# Patient Record
Sex: FEMALE | Race: WHITE | Employment: OTHER | URBAN - METROPOLITAN AREA
[De-identification: names, ages, dates, MRNs, and addresses within clinical notes are randomized per-mention and may not be internally consistent; named-entity substitution may affect disease eponyms.]

---

## 2017-01-23 DIAGNOSIS — Z85.3 HISTORY OF LEFT BREAST CANCER: ICD-10-CM

## 2017-01-23 DIAGNOSIS — Z12.31 VISIT FOR SCREENING MAMMOGRAM: ICD-10-CM

## 2017-01-23 DIAGNOSIS — Z90.12 HISTORY OF MASTECTOMY, LEFT: ICD-10-CM

## 2017-02-07 RX ORDER — DESVENLAFAXINE SUCCINATE 100 MG/1
TABLET, EXTENDED RELEASE ORAL
Qty: 90 TAB | Refills: 0 | Status: SHIPPED | OUTPATIENT
Start: 2017-02-07 | End: 2017-03-23 | Stop reason: SDUPTHER

## 2017-03-23 RX ORDER — DESVENLAFAXINE SUCCINATE 100 MG/1
TABLET, EXTENDED RELEASE ORAL
Qty: 90 TAB | Refills: 1 | Status: SHIPPED | OUTPATIENT
Start: 2017-03-23 | End: 2017-09-19 | Stop reason: SDUPTHER

## 2017-07-24 DIAGNOSIS — Z90.12 HISTORY OF MASTECTOMY, LEFT: ICD-10-CM

## 2017-07-24 DIAGNOSIS — Z12.31 VISIT FOR SCREENING MAMMOGRAM: ICD-10-CM

## 2017-07-24 DIAGNOSIS — Z85.3 HISTORY OF LEFT BREAST CANCER: ICD-10-CM

## 2017-09-19 RX ORDER — DESVENLAFAXINE 100 MG/1
TABLET, EXTENDED RELEASE ORAL
Qty: 90 TAB | Refills: 1 | Status: SHIPPED | OUTPATIENT
Start: 2017-09-19 | End: 2017-11-07 | Stop reason: SDUPTHER

## 2017-11-07 ENCOUNTER — OFFICE VISIT (OUTPATIENT)
Dept: INTERNAL MEDICINE CLINIC | Age: 62
End: 2017-11-07

## 2017-11-07 VITALS
RESPIRATION RATE: 16 BRPM | HEART RATE: 64 BPM | WEIGHT: 167 LBS | OXYGEN SATURATION: 98 % | BODY MASS INDEX: 27.82 KG/M2 | HEIGHT: 65 IN | TEMPERATURE: 98.4 F

## 2017-11-07 DIAGNOSIS — Z23 NEED FOR TDAP VACCINATION: ICD-10-CM

## 2017-11-07 DIAGNOSIS — G89.29 CHRONIC RIGHT SHOULDER PAIN: ICD-10-CM

## 2017-11-07 DIAGNOSIS — Z00.00 ROUTINE GENERAL MEDICAL EXAMINATION AT A HEALTH CARE FACILITY: Primary | ICD-10-CM

## 2017-11-07 DIAGNOSIS — Z85.3 HISTORY OF BREAST CANCER: ICD-10-CM

## 2017-11-07 DIAGNOSIS — M25.511 CHRONIC RIGHT SHOULDER PAIN: ICD-10-CM

## 2017-11-07 RX ORDER — METFORMIN HYDROCHLORIDE 500 MG/1
500 TABLET, EXTENDED RELEASE ORAL
Qty: 90 TAB | Refills: 3 | Status: SHIPPED | OUTPATIENT
Start: 2017-11-07 | End: 2018-03-15 | Stop reason: DRUGHIGH

## 2017-11-07 RX ORDER — DESVENLAFAXINE 100 MG/1
TABLET, EXTENDED RELEASE ORAL
Qty: 90 TAB | Refills: 3 | Status: SHIPPED | OUTPATIENT
Start: 2017-11-07 | End: 2018-11-14 | Stop reason: SDUPTHER

## 2017-11-07 RX ORDER — ALPRAZOLAM 0.5 MG/1
TABLET ORAL
Qty: 30 TAB | Refills: 1 | Status: SHIPPED | OUTPATIENT
Start: 2017-11-07 | End: 2018-10-17 | Stop reason: SDUPTHER

## 2017-11-07 NOTE — MR AVS SNAPSHOT
Visit Information Date & Time Provider Department Dept. Phone Encounter #  
 11/7/2017  8:30 AM Mike Sales MD [facility] Internal Medicine [phone] Upcoming Health Maintenance Date Due DTaP/Tdap/Td series (1 - Tdap) 9/7/1976 PAP AKA CERVICAL CYTOLOGY 9/7/1976 BREAST CANCER SCRN MAMMOGRAM 1/12/2019 COLONOSCOPY 2/6/2024 Allergies as of 11/7/2017  Review Complete On: 11/7/2017 By: Mike Sales MD  
 No Known Allergies Current Immunizations  Reviewed on 11/7/2017 Name Date Tdap 11/7/2017 Reviewed by Mike Sales MD on 11/7/2017 at  9:08 AM  
 Reviewed by Mike Sales MD on 11/7/2017 at  9:24 AM  
You Were Diagnosed With   
  
 Codes Comments Routine general medical examination at a health care facility    -  Primary ICD-10-CM: Z00.00 ICD-9-CM: V70.0 Need for Tdap vaccination     ICD-10-CM: L45 ICD-9-CM: V06.1 Chronic right shoulder pain     ICD-10-CM: M25.511, G89.29 ICD-9-CM: 719.41, 338.29 History of breast cancer     ICD-10-CM: Z85.3 ICD-9-CM: V10.3 Vitals Pulse Temp Resp Height(growth percentile) Weight(growth percentile) SpO2  
 64 98.4 °F (36.9 °C) (Oral) 16 5' 4.75\" (1.645 m) 167 lb (75.8 kg) 98% BMI OB Status Smoking Status 28.01 kg/m2 Postmenopausal Never Smoker Vitals History BMI and BSA Data Body Mass Index Body Surface Area 28.01 kg/m 2 1.86 m 2 Preferred Pharmacy Pharmacy Name Phone [id number] [phone] Your Updated Medication List  
  
   
This list is accurate as of: 11/7/17  9:24 AM.  Always use your most recent med list.  
  
  
  
  
 ALPRAZolam 0.5 mg tablet Commonly known as:  XANAX  
TAKE 1 TABLET BY MOUTH THREE TIMES A DAY AS NEEDED FOR SLEEP OR ANXIETY Desvenlafaxine 100 mg Tb24 TAKE 1 TABLET BY MOUTH EVERY DAY  
  
 metFORMIN  mg tablet Commonly known as:  GLUCOPHAGE XR Take 1 Tab by mouth daily (with dinner). Prescriptions Printed Refills ALPRAZolam (XANAX) 0.5 mg tablet 1 Sig: TAKE 1 TABLET BY MOUTH THREE TIMES A DAY AS NEEDED FOR SLEEP OR ANXIETY Class: Print Prescriptions Sent to Pharmacy Refills Desvenlafaxine 100 mg Tb24 3 Sig: TAKE 1 TABLET BY MOUTH EVERY DAY Class: Normal  
 Pharmacy: [facility] [address] Ph #: [phone]  
 metFORMIN ER (GLUCOPHAGE XR) 500 mg tablet 3 Sig: Take 1 Tab by mouth daily (with dinner). Class: Normal  
 Pharmacy: [address] Ph #: [phone] Route: Oral  
  
We Performed the Following CBC WITH AUTOMATED DIFF [55309 CPT(R)] LIPID PANEL [53959 CPT(R)] METABOLIC PANEL, COMPREHENSIVE [52410 CPT(R)] OK IMMUNIZ ADMIN,1 SINGLE/COMB VAC/TOXOID V9561830 CPT(R)] REFERRAL TO OBSTETRICS AND GYNECOLOGY [REF51 Custom] REFERRAL TO PHYSICAL THERAPY [CWL05 Custom] REFERRAL TO PLASTIC SURGERY [REF89 Custom] TETANUS, DIPHTHERIA TOXOIDS AND ACELLULAR PERTUSSIS VACCINE (TDAP), IN INDIVIDS. >=7, IM [id number] CPT(R)] TSH RFX ON ABNORMAL TO FREE T4 [[id number] Custom] UA/M W/RFLX CULTURE, ROUTINE [[id number] Custom] Referral Information Referral ID Referred By Referred To  
  
 [id number] [facility] [address] [facility], 40 [facility] Visits Status Start Date End Date 1 New Request 11/7/17 11/7/18 If your referral has a status of pending review or denied, additional information will be sent to support the outcome of this decision. Referral ID Referred By Referred To  
 [id number] Sudhir ALLEN Not Available Visits Status Start Date End Date 1 New Request 11/7/17 11/7/18  If your referral has a status of pending review or denied, additional information will be sent to support the outcome of this decision. Referral ID Referred By Referred To  
 [id number] [facility] [address], [facility]6 Daquan Catalan Visits Status Start Date End Date 1 New Request 11/7/17 11/7/18 If your referral has a status of pending review or denied, additional information will be sent to support the outcome of this decision. Introducing [facility]! Dear Yumiko Morton: Thank you for requesting a [employer] account. Our records indicate that you already have an active [employer] account. You can access your account anytime at [email or web address] Did you know that you can access your hospital and ER discharge instructions at any time in [employer]? You can also review all of your test results from your hospital stay or ER visit. Additional Information If you have questions, please visit the Frequently Asked Questions section of the [employer] website at [email or web address] Remember, [employer] is NOT to be used for urgent needs. For medical emergencies, dial 911. Now available from your iPhone and Android! Please provide this summary of care documentation to your next provider. Your primary care clinician is listed as German Lakhani64 If you have any questions after today's visit, please call [phone].

## 2017-11-07 NOTE — PROGRESS NOTES
Subjective:   58 y.o. female for Well Woman Check. Her gyne and breast care is done elsewhere by her Ob-Gyne physician. Patient Active Problem List    Diagnosis Date Noted    History of breast cancer 11/07/2017    Advance directive discussed with patient 04/20/2016    Vitamin D deficiency 10/04/2012    History of Mastectomy 04/01/2009    History of tonsillectomy 04/01/2009    S/P LASIK Surgery 04/01/2009    Hx of breast reconstruction 04/01/2009    Depression 04/01/2009     Current Outpatient Prescriptions   Medication Sig Dispense Refill    Desvenlafaxine 100 mg Tb24 TAKE 1 TABLET BY MOUTH EVERY DAY 90 Tab 3    ALPRAZolam (XANAX) 0.5 mg tablet TAKE 1 TABLET BY MOUTH THREE TIMES A DAY AS NEEDED FOR SLEEP OR ANXIETY 30 Tab 1    metFORMIN ER (GLUCOPHAGE XR) 500 mg tablet Take 1 Tab by mouth daily (with dinner).  80 Tab 3     No Known Allergies  Past Medical History:   Diagnosis Date    Cancer ([facility] [address]     Depression 4/1/2009    History of mastectomy 4/1/2009    History of tonsillectomy 4/1/2009    Hx of breast reconstruction 4/1/2009    S/P LASIK surgery 4/1/2009     Past Surgical History:   Procedure Laterality Date    BREAST SURGERY PROCEDURE UNLISTED      HX BREAST RECONSTRUCTION      HX MASTECTOMY      HX REFRACTIVE SURGERY      HX TONSILLECTOMY       Family History   Problem Relation Age of Onset    Cancer Mother      Breast    Heart Failure Mother     Heart Disease Mother     Cancer Father      Brain tumor    Cancer Maternal Grandmother     Psychiatric Disorder Daughter     Psychiatric Disorder Daughter      Social History   Substance Use Topics    Smoking status: Never Smoker    Smokeless tobacco: Never Used    Alcohol use 3.5 oz/week     7 Standard drinks or equivalent per week        Lab Results   Component Value Date/Time    WBC 6.1 12/08/2016 08:50 AM    HGB 13.6 12/08/2016 08:50 AM    HCT 40.6 12/08/2016 08:50 AM    PLATELET 797 05/42/9678 08:50 AM    MCV 88 12/08/2016 08:50 AM     Lab Results   Component Value Date/Time    Cholesterol, total 246 12/08/2016 08:50 AM    HDL Cholesterol 71 12/08/2016 08:50 AM    LDL, calculated 162 12/08/2016 08:50 AM    Triglyceride 66 12/08/2016 08:50 AM     Lab Results   Component Value Date/Time    ALT (SGPT) 18 12/08/2016 08:50 AM    AST (SGOT) 19 12/08/2016 08:50 AM    Alk. phosphatase 113 12/08/2016 08:50 AM    Bilirubin, total <0.2 12/08/2016 08:50 AM    Albumin 4.1 12/08/2016 08:50 AM    Protein, total 6.3 12/08/2016 08:50 AM    PLATELET 678 26/50/3056 08:50 AM    Hepatitis C RNA, QL, ANNEL Negative 12/13/2016 09:02 AM       Lab Results   Component Value Date/Time    GFR est non-AA 94 12/08/2016 08:50 AM    GFR est  12/08/2016 08:50 AM    Creatinine 0.69 12/08/2016 08:50 AM    BUN 13 12/08/2016 08:50 AM    Sodium 145 12/08/2016 08:50 AM    Potassium 4.5 12/08/2016 08:50 AM    Chloride 104 12/08/2016 08:50 AM    CO2 28 12/08/2016 08:50 AM     Lab Results   Component Value Date/Time    TSH 1.580 12/08/2016 08:50 AM    TSH 1.450 11/21/2013 08:29 AM    T4, Free 1.26 11/21/2013 08:29 AM      Lab Results   Component Value Date/Time    Glucose 84 12/08/2016 08:50 AM                           Specific concerns today: Ongoing issues with her right shoulder. She seen an orthopedist and had it injected twice. She needs to do physical therapy and needs a referral for that. It is painful mostly at nighttime. She is also had increasing issues with her breast implants from previous reconstruction and would like to consider repeat surgery. She is up-to-date on mammogram and breast MRI. Does feel that depression and anxiety are under good control with the current medicines. She is struggling with how to handle health care on an ongoing basis. .    Review of Systems  A comprehensive review of systems was negative except for that written in the HPI. Objective:   Pulse 64, temperature 98.4 °F (36.9 °C), temperature source Oral, resp.  rate 16, height 5' 4.75\" (1.645 m), weight 167 lb (75.8 kg), SpO2 98 %. Physical Examination:   General appearance - alert, well appearing, and in no distress  Eyes - pupils equal and reactive, extraocular eye movements intact  Ears - bilateral TM's and external ear canals normal  Nose - normal and patent, no erythema, discharge or polyps  Mouth - mucous membranes moist, pharynx normal without lesions  Neck - supple, no significant adenopathy  Lymphatics - no palpable lymphadenopathy, no hepatosplenomegaly  Chest - clear to auscultation, no wheezes, rales or rhonchi, symmetric air entry  Heart - normal rate, regular rhythm, normal S1, S2, no murmurs, rubs, clicks or gallops  Abdomen - soft, nontender, nondistended, no masses or organomegaly  Neurological - alert, oriented, normal speech, no focal findings or movement disorder noted  Musculoskeletal - abnormal exam of right shoulder with some mild crepitus and anterior joint line tenderness. No effusion or erythema. Normal range of motion. Extremities - peripheral pulses normal, no pedal edema, no clubbing or cyanosis     Assessment/Plan:     lose weight, increase physical activity, routine labs ordered  Diagnoses and all orders for this visit:    1. Routine general medical examination at a health care facility  -     CBC WITH AUTOMATED DIFF  -     METABOLIC PANEL, COMPREHENSIVE  -     LIPID PANEL  -     TSH RFX ON ABNORMAL TO FREE T4  -     UA/M W/RFLX CULTURE, ROUTINE  -     Desvenlafaxine 100 mg Tb24; TAKE 1 TABLET BY MOUTH EVERY DAY  -     ALPRAZolam (XANAX) 0.5 mg tablet; TAKE 1 TABLET BY MOUTH THREE TIMES A DAY AS NEEDED FOR SLEEP OR ANXIETY  -     REFERRAL TO OBSTETRICS AND GYNECOLOGY    2. Need for Tdap vaccination  -     TETANUS, DIPHTHERIA TOXOIDS AND ACELLULAR PERTUSSIS VACCINE (TDAP), IN INDIVIDS. >=7, IM  -     ID IMMUNIZ ADMIN,1 SINGLE/COMB VAC/TOXOID    3. Chronic right shoulder pain impingement syndrome.   Will refer for physical therapy and have her continue to see Sebastian Julia as needed. -     REFERRAL TO PHYSICAL THERAPY    4. History of breast cancer will refer for an evaluation for re-do of her breast implants.  -     REFERRAL TO PLASTIC SURGERY    Other orders given her weight issues we discussed the whole 30 diet as well as fast metabolism. Will add Metformin for insulin sensitivity and she will let me know how she doing with that in a month. -     metFORMIN ER (GLUCOPHAGE XR) 500 mg tablet; Take 1 Tab by mouth daily (with dinner). Follow-up Disposition: 12 months and as needed   Advised her to call back or return to office if symptoms worsen/change/persist.  Discussed expected course/resolution/complications of diagnosis in detail with patient. Medication risks/benefits/costs/interactions/alternatives discussed with patient. She was given an after visit summary which includes diagnoses, current medications, & vitals. She expressed understanding with the diagnosis and plan.

## 2017-12-30 LAB
ALBUMIN SERPL-MCNC: 4.3 G/DL (ref 3.6–4.8)
ALBUMIN/GLOB SERPL: 2 {RATIO} (ref 1.2–2.2)
ALP SERPL-CCNC: 110 IU/L (ref 39–117)
ALT SERPL-CCNC: 24 IU/L (ref 0–32)
APPEARANCE UR: CLEAR
AST SERPL-CCNC: 24 IU/L (ref 0–40)
BACTERIA #/AREA URNS HPF: ABNORMAL /[HPF]
BASOPHILS # BLD AUTO: 0 X10E3/UL (ref 0–0.2)
BASOPHILS NFR BLD AUTO: 0 %
BILIRUB SERPL-MCNC: 0.2 MG/DL (ref 0–1.2)
BILIRUB UR QL STRIP: NEGATIVE
BUN SERPL-MCNC: 13 MG/DL (ref 8–27)
BUN/CREAT SERPL: 20 (ref 12–28)
CALCIUM SERPL-MCNC: 8.7 MG/DL (ref 8.7–10.3)
CASTS URNS QL MICRO: ABNORMAL /LPF
CHLORIDE SERPL-SCNC: 102 MMOL/L (ref 96–106)
CHOLEST SERPL-MCNC: 247 MG/DL (ref 100–199)
CO2 SERPL-SCNC: 25 MMOL/L (ref 18–29)
COLOR UR: YELLOW
CREAT SERPL-MCNC: 0.64 MG/DL (ref 0.57–1)
EOSINOPHIL # BLD AUTO: 0.3 X10E3/UL (ref 0–0.4)
EOSINOPHIL NFR BLD AUTO: 4 %
EPI CELLS #/AREA URNS HPF: ABNORMAL /HPF
ERYTHROCYTE [DISTWIDTH] IN BLOOD BY AUTOMATED COUNT: 13.6 % (ref 12.3–15.4)
GLOBULIN SER CALC-MCNC: 2.2 G/DL (ref 1.5–4.5)
GLUCOSE SERPL-MCNC: 89 MG/DL (ref 65–99)
GLUCOSE UR QL: NEGATIVE
HCT VFR BLD AUTO: 42.5 % (ref 34–46.6)
HDLC SERPL-MCNC: 75 MG/DL
HGB BLD-MCNC: 14.6 G/DL (ref 11.1–15.9)
HGB UR QL STRIP: ABNORMAL
IMM GRANULOCYTES # BLD: 0 X10E3/UL (ref 0–0.1)
IMM GRANULOCYTES NFR BLD: 0 %
KETONES UR QL STRIP: NEGATIVE
LDLC SERPL CALC-MCNC: 149 MG/DL (ref 0–99)
LEUKOCYTE ESTERASE UR QL STRIP: NEGATIVE
LYMPHOCYTES # BLD AUTO: 2 X10E3/UL (ref 0.7–3.1)
LYMPHOCYTES NFR BLD AUTO: 35 %
MCH RBC QN AUTO: 30.1 PG (ref 26.6–33)
MCHC RBC AUTO-ENTMCNC: 34.4 G/DL (ref 31.5–35.7)
MCV RBC AUTO: 88 FL (ref 79–97)
MICRO URNS: ABNORMAL
MONOCYTES # BLD AUTO: 0.4 X10E3/UL (ref 0.1–0.9)
MONOCYTES NFR BLD AUTO: 7 %
MUCOUS THREADS URNS QL MICRO: PRESENT
NEUTROPHILS # BLD AUTO: 3.2 X10E3/UL (ref 1.4–7)
NEUTROPHILS NFR BLD AUTO: 54 %
NITRITE UR QL STRIP: NEGATIVE
PH UR STRIP: 6 [PH] (ref 5–7.5)
PLATELET # BLD AUTO: 273 X10E3/UL (ref 150–379)
POTASSIUM SERPL-SCNC: 4 MMOL/L (ref 3.5–5.2)
PROT SERPL-MCNC: 6.5 G/DL (ref 6–8.5)
PROT UR QL STRIP: NEGATIVE
RBC # BLD AUTO: 4.85 X10E6/UL (ref 3.77–5.28)
RBC #/AREA URNS HPF: ABNORMAL /HPF
SODIUM SERPL-SCNC: 143 MMOL/L (ref 134–144)
SP GR UR: 1.02 (ref 1–1.03)
TRIGL SERPL-MCNC: 116 MG/DL (ref 0–149)
TSH SERPL DL<=0.005 MIU/L-ACNC: 1.75 UIU/ML (ref 0.45–4.5)
URINALYSIS REFLEX, 377202: ABNORMAL
UROBILINOGEN UR STRIP-MCNC: 0.2 MG/DL (ref 0.2–1)
VLDLC SERPL CALC-MCNC: 23 MG/DL (ref 5–40)
WBC # BLD AUTO: 5.9 X10E3/UL (ref 3.4–10.8)
WBC #/AREA URNS HPF: ABNORMAL /HPF

## 2018-03-15 ENCOUNTER — OFFICE VISIT (OUTPATIENT)
Dept: INTERNAL MEDICINE CLINIC | Age: 63
End: 2018-03-15

## 2018-03-15 VITALS
BODY MASS INDEX: 26.69 KG/M2 | RESPIRATION RATE: 12 BRPM | SYSTOLIC BLOOD PRESSURE: 128 MMHG | HEART RATE: 70 BPM | OXYGEN SATURATION: 97 % | TEMPERATURE: 97.5 F | WEIGHT: 160.2 LBS | DIASTOLIC BLOOD PRESSURE: 90 MMHG | HEIGHT: 65 IN

## 2018-03-15 DIAGNOSIS — R63.8 WEIGHT DISORDER: Primary | ICD-10-CM

## 2018-03-15 RX ORDER — VALACYCLOVIR HYDROCHLORIDE 1 G/1
2000 TABLET, FILM COATED ORAL 2 TIMES DAILY
Qty: 16 TAB | Refills: 5 | Status: SHIPPED | OUTPATIENT
Start: 2018-03-15 | End: 2019-06-07 | Stop reason: SDUPTHER

## 2018-03-15 RX ORDER — METFORMIN HYDROCHLORIDE 1000 MG/1
1000 TABLET ORAL DAILY
Qty: 90 TAB | Refills: 1 | Status: SHIPPED | OUTPATIENT
Start: 2018-03-15 | End: 2018-12-16 | Stop reason: SDUPTHER

## 2018-03-15 NOTE — MR AVS SNAPSHOT
[address] [address] 
[phone] Patient: Leanna Toledo MRN:  [id number] Visit Information Date & Time Provider Department Dept. Phone Encounter #  
 3/15/2018  1:15 PM Aleksandar Power MD [address] [address] Internal Medicine [phone] [id number] Upcoming Health Maintenance Date Due  
 PAP AKA CERVICAL CYTOLOGY 9/7/1976 BREAST CANCER SCRN MAMMOGRAM 1/12/2019 COLONOSCOPY 2/6/2024 DTaP/Tdap/Td series (2 - Td) 11/7/2027 Allergies as of 3/15/2018  Review Complete On: 3/15/2018 By: Sharri Rodriguez LPN No Known Allergies Current Immunizations  Reviewed on 11/7/2017 Name Date Tdap 11/7/2017 Not reviewed this visit You Were Diagnosed With   
  
 Codes Comments Weight disorder    -  Primary ICD-10-CM: R63.8 ICD-9-CM: 603. 9 Vitals BP Pulse Temp Resp Height(growth percentile) Weight(growth percentile) 128/90 70 97.5 °F (36.4 °C) (Oral) 12 5' 4.75\" (1.645 m) 160 lb 3.2 oz (72.7 kg) SpO2 BMI OB Status Smoking Status 97% 26.86 kg/m2 Postmenopausal Never Smoker Vitals History BMI and BSA Data Body Mass Index Body Surface Area  
 26.86 kg/m 2 1.82 m 2 Preferred Pharmacy [facility] Name Phone [id number] [phone] Your Updated Medication List  
  
   
This list is accurate as of 3/15/18  1:47 PM.  Always use your most recent med list.  
  
  
  
  
 ALPRAZolam 0.5 mg tablet Commonly known as:  XANAX  
TAKE 1 TABLET BY MOUTH THREE TIMES A DAY AS NEEDED FOR SLEEP OR ANXIETY Desvenlafaxine 100 mg Tb24 TAKE 1 TABLET BY MOUTH EVERY DAY  
  
 metFORMIN 1,000 mg tablet Commonly known as:  GLUCOPHAGE Take 1 Tab by mouth daily. valACYclovir 1 gram tablet Commonly known as:  VALTREX Take 2 Tabs by mouth two (2) times a day for 1 day. Prescriptions Sent to Pharmacy Refills  
 valACYclovir (VALTREX) 1 gram tablet 5 Sig: Take 2 Tabs by mouth two (2) times a day for 1 day. Class: Normal  
 Pharmacy: [address] Ph #: [phone] Route: Oral  
 metFORMIN (GLUCOPHAGE) 1,000 mg tablet 1 Sig: Take 1 Tab by mouth daily. Class: Normal  
 [facility] [address] [facility]: [phone] Route: Oral  
  
We Performed the Following   
 T3 TOTAL R3080983 CPT(R)] T4, FREE R2060833 CPT(R)] TSH 3RD GENERATION [80467 CPT(R)] Introducing [facility]! Dear Zena Ashton: Thank you for requesting a [employer] account. Our records indicate that you already have an active [employer] account. You can access your account anytime at [email or web address] Did you know that you can access your hospital and ER discharge instructions at any time in [employer] You can also review all of your test results from your hospital stay or ER visit. Additional Information If you have questions, please visit the Frequently Asked Questions section of the [employer] website at [email or web address] Remember, [employer] is NOT to be used for urgent needs. For medical emergencies, dial 911. Now available from your iPhone and Android! Please provide this summary of care documentation to your next provider. Your primary care clinician is listed as German Lakhani64 If you have any questions after today's visit, please call [phone].

## 2018-03-15 NOTE — PROGRESS NOTES
HPI:  Frederic Luna is a 58y.o. year old female who is here for a routine visit:    Here for follow-up visit and to discuss her weight. She has been on a high-protein low-carb diet for the last 3 months. She has lost about 9 pounds. She did try taking Metformin 1 a day but stopped that she did not feel it was doing much. I had recommended her increase to 1000 mg per day but she has not done that yet. She has been exercising 4 times per week or more. She feels very well. She has a wedding coming up in May and is concerned about getting more weight off. She denies any chest pains or shortness of breath. No nausea vomiting. She is worried about thyroid issues. Her previous TSH has been normal for years. Past Medical History:   Diagnosis Date    Cancer ([address]     Depression 4/1/2009    History of mastectomy 4/1/2009    History of tonsillectomy 4/1/2009    Hx of breast reconstruction 4/1/2009    S/P LASIK surgery 4/1/2009       Past Surgical History:   Procedure Laterality Date    BREAST SURGERY PROCEDURE UNLISTED      HX BREAST RECONSTRUCTION      HX MASTECTOMY      HX REFRACTIVE SURGERY      HX TONSILLECTOMY         Prior to Admission medications    Medication Sig Start Date End Date Taking? Authorizing Provider   valACYclovir (VALTREX) 1 gram tablet Take 2 Tabs by mouth two (2) times a day for 1 day. 3/15/18 3/16/18 Yes Rhonda Johnson III, MD   metFORMIN (GLUCOPHAGE) 1,000 mg tablet Take 1 Tab by mouth daily. 3/15/18  Yes Rhonda Johnson III, MD   Desvenlafaxine 100 mg Tb24 TAKE 1 TABLET BY MOUTH EVERY DAY 11/7/17  Yes Haseeb Menard MD   ALPRAZolam [place]) 0.5 mg tablet TAKE 1 TABLET BY MOUTH THREE TIMES A DAY AS NEEDED FOR SLEEP OR ANXIETY 11/7/17  Yes Haseeb Menard MD       Social History     Social History    Marital status:      Spouse name: N/A    Number of children: N/A    Years of education: N/A     Occupational History    Not on file.      Social History Main Topics    Smoking status: Never Smoker    Smokeless tobacco: Never Used    Alcohol use 1.2 oz/week     2 Standard drinks or equivalent per week    Drug use: No    Sexual activity: Yes     Partners: Male     Other Topics Concern    Not on file     Social History Narrative          ROS  Per HPI    Visit Vitals    /90    Pulse 70    Temp 97.5 °F (36.4 °C) (Oral)    Resp 12    Ht 5' 4.75\" (1.645 m)    Wt 160 lb 3.2 oz (72.7 kg)    SpO2 97%    BMI 26.86 kg/m2         Physical Exam     Physical Examination: General appearance - alert, well appearing, and in no distress  Chest - clear to auscultation, no wheezes, rales or rhonchi, symmetric air entry  Heart - normal rate, regular rhythm, normal S1, S2, no murmurs, rubs, clicks or gallops    Assessment/Plan:  Diagnoses and all orders for this visit:    1. Weight disorder -likely due to metabolic issues. Will recheck her thyroid function today. Will increase her Metformin to 1000 mg per day. We discussed appetite suppressants and stimulants and she wishes to defer that for 1 more month and then make a decision about using those. -     T4, FREE  -     T3 TOTAL  -     TSH 3RD GENERATION    Other orders  -     valACYclovir (VALTREX) 1 gram tablet; Take 2 Tabs by mouth two (2) times a day for 1 day. -     metFORMIN (GLUCOPHAGE) 1,000 mg tablet; Take 1 Tab by mouth daily. Follow-up Disposition:as needed       Advised her to call back or return to office if symptoms worsen/change/persist.  Discussed expected course/resolution/complications of diagnosis in detail with patient. Medication risks/benefits/costs/interactions/alternatives discussed with patient. She was given an after visit summary which includes diagnoses, current medications, & vitals. She expressed understanding with the diagnosis and plan.

## 2018-03-16 LAB
T3 SERPL-MCNC: 102 NG/DL (ref 71–180)
T4 FREE SERPL-MCNC: 1.15 NG/DL (ref 0.82–1.77)
TSH SERPL DL<=0.005 MIU/L-ACNC: 1.4 UIU/ML (ref 0.45–4.5)

## 2018-10-17 DIAGNOSIS — Z00.00 ROUTINE GENERAL MEDICAL EXAMINATION AT A HEALTH CARE FACILITY: ICD-10-CM

## 2018-10-17 RX ORDER — ALPRAZOLAM 0.5 MG/1
0.5 TABLET ORAL
Qty: 30 TAB | Refills: 1 | OUTPATIENT
Start: 2018-10-17 | End: 2021-12-22 | Stop reason: SDUPTHER

## 2018-10-17 NOTE — TELEPHONE ENCOUNTER
Alprazolam (Xanax) 0.5mg tabs  [id number]    Patient flying to [place] on Saturday. Needs to get this by Thurs or Fri.

## 2018-10-18 NOTE — TELEPHONE ENCOUNTER
Orders Placed This Encounter    ALPRAZolam (XANAX) 0.5 mg tablet     Sig: Take 1 Tab by mouth nightly as needed for Anxiety. Max Daily Amount: 0.5 mg.     Dispense:  30 Tab     Refill:  1     This request is for a new prescription for a controlled substance as required by Federal/State law. .     The above controlled substance refill was called into patients pharmacy - [facility]/ - authorized by Dr. Aram Luis.

## 2018-11-14 DIAGNOSIS — Z00.00 ROUTINE GENERAL MEDICAL EXAMINATION AT A HEALTH CARE FACILITY: ICD-10-CM

## 2018-11-14 RX ORDER — DESVENLAFAXINE 100 MG/1
TABLET, EXTENDED RELEASE ORAL
Qty: 30 TAB | Refills: 0 | Status: SHIPPED | OUTPATIENT
Start: 2018-11-14 | End: 2018-12-16 | Stop reason: SDUPTHER

## 2018-12-03 ENCOUNTER — OFFICE VISIT (OUTPATIENT)
Dept: INTERNAL MEDICINE CLINIC | Age: 63
End: 2018-12-03

## 2018-12-03 VITALS
OXYGEN SATURATION: 98 % | WEIGHT: 151 LBS | SYSTOLIC BLOOD PRESSURE: 144 MMHG | DIASTOLIC BLOOD PRESSURE: 94 MMHG | TEMPERATURE: 98.5 F | HEIGHT: 65 IN | BODY MASS INDEX: 25.16 KG/M2 | RESPIRATION RATE: 14 BRPM | HEART RATE: 67 BPM

## 2018-12-03 DIAGNOSIS — Z85.3 HISTORY OF BREAST CANCER: ICD-10-CM

## 2018-12-03 DIAGNOSIS — Z00.00 ROUTINE GENERAL MEDICAL EXAMINATION AT A HEALTH CARE FACILITY: Primary | ICD-10-CM

## 2018-12-03 DIAGNOSIS — Z23 NEED FOR SHINGLES VACCINE: ICD-10-CM

## 2018-12-03 DIAGNOSIS — Z00.00 WELL WOMAN EXAM (NO GYNECOLOGICAL EXAM): ICD-10-CM

## 2018-12-03 DIAGNOSIS — F32.A MILD DEPRESSION: ICD-10-CM

## 2018-12-03 DIAGNOSIS — Z12.4 PAP SMEAR FOR CERVICAL CANCER SCREENING: ICD-10-CM

## 2018-12-04 NOTE — PROGRESS NOTES
Subjective:   61 y.o. female for Well Woman Check. Her gyne and breast care is done elsewhere by her Ob-Gyne physician. Patient Active Problem List    Diagnosis Date Noted    Mild depression ([facility] [address] 12/03/2018    History of breast cancer 11/07/2017    Advance directive discussed with patient 04/20/2016    Vitamin D deficiency 10/04/2012    History of Mastectomy 04/01/2009    History of tonsillectomy 04/01/2009    S/P LASIK Surgery 04/01/2009    Hx of breast reconstruction 04/01/2009    Depression 04/01/2009     Current Outpatient Medications   Medication Sig Dispense Refill    varicella-zoster recombinant, PF, (SHINGRIX, PF,) 50 mcg/0.5 mL susr injection 0.5 mL by IntraMUSCular route once for 1 dose. 0.5 mL 0    Desvenlafaxine 100 mg Tb24 TAKE 1 TABLET BY MOUTH EVERY DAY 30 Tab 0    ALPRAZolam (XANAX) 0.5 mg tablet Take 1 Tab by mouth nightly as needed for Anxiety. Max Daily Amount: 0.5 mg. 30 Tab 1    metFORMIN (GLUCOPHAGE) 1,000 mg tablet Take 1 Tab by mouth daily. 90 Tab 1     No Known Allergies  Past Medical History:   Diagnosis Date    [facility]     Depression 4/1/2009    History of mastectomy 4/1/2009    History of tonsillectomy 4/1/2009    Hx of breast reconstruction 4/1/2009    S/P LASIK surgery 4/1/2009     Past Surgical History:   Procedure Laterality Date    BREAST SURGERY PROCEDURE UNLISTED      HX BREAST RECONSTRUCTION      HX MASTECTOMY      HX REFRACTIVE SURGERY      HX TONSILLECTOMY       Family History   Problem Relation Age of Onset    Cancer Mother         Breast    Heart Failure Mother     Heart Disease Mother     Cancer Father         Brain tumor    Cancer Maternal Grandmother     Psychiatric Disorder Daughter     Psychiatric Disorder Daughter      Social History     Tobacco Use    Smoking status: Never Smoker    Smokeless tobacco: Never Used   Substance Use Topics    Alcohol use:  Yes     Alcohol/week: 1.2 oz     Types: 2 Standard drinks or equivalent per week     Frequency: 4 or more times a week     Drinks per session: 1 or 2        Lab Results   Component Value Date/Time    WBC 5.9 12/29/2017 09:10 AM    HGB 14.6 12/29/2017 09:10 AM    HCT 42.5 12/29/2017 09:10 AM    PLATELET 893 84/76/6128 09:10 AM    MCV 88 12/29/2017 09:10 AM     Lab Results   Component Value Date/Time    Cholesterol, total 247 (H) 12/29/2017 09:10 AM    HDL Cholesterol 75 12/29/2017 09:10 AM    LDL, calculated 149 (H) 12/29/2017 09:10 AM    Triglyceride 116 12/29/2017 09:10 AM     Lab Results   Component Value Date/Time    ALT (SGPT) 24 12/29/2017 09:10 AM    AST (SGOT) 24 12/29/2017 09:10 AM    Alk. phosphatase 110 12/29/2017 09:10 AM    Bilirubin, total 0.2 12/29/2017 09:10 AM    Albumin 4.3 12/29/2017 09:10 AM    Protein, total 6.5 12/29/2017 09:10 AM    PLATELET 828 23/53/5306 09:10 AM    Hepatitis C RNA, QL, ANNEL Negative 12/13/2016 09:02 AM     Lab Results   Component Value Date/Time    GFR est non-AA 96 12/29/2017 09:10 AM    GFR est  12/29/2017 09:10 AM    Creatinine 0.64 12/29/2017 09:10 AM    BUN 13 12/29/2017 09:10 AM    Sodium 143 12/29/2017 09:10 AM    Potassium 4.0 12/29/2017 09:10 AM    Chloride 102 12/29/2017 09:10 AM    CO2 25 12/29/2017 09:10 AM     Lab Results   Component Value Date/Time    TSH 1.400 03/15/2018 02:17 PM    T4, Free 1.15 03/15/2018 02:17 PM      Lab Results   Component Value Date/Time    Glucose 89 12/29/2017 09:10 AM         Specific concerns today: None. Review of Systems  A comprehensive review of systems was negative. Objective:   Blood pressure (!) 144/94, pulse 67, temperature 98.5 °F (36.9 °C), temperature source Oral, resp. rate 14, height 5' 4.5\" (1.638 m), weight 151 lb (68.5 kg), SpO2 98 %.    Physical Examination:   General appearance - alert, well appearing, and in no distress  Eyes - pupils equal and reactive, extraocular eye movements intact  Ears - bilateral TM's and external ear canals normal  Nose - normal and patent, no erythema, discharge or polyps  Mouth - mucous membranes moist, pharynx normal without lesions  Neck - supple, no significant adenopathy  Lymphatics - no palpable lymphadenopathy, no hepatosplenomegaly  Chest - clear to auscultation, no wheezes, rales or rhonchi, symmetric air entry  Heart - normal rate, regular rhythm, normal S1, S2, no murmurs, rubs, clicks or gallops  Abdomen - soft, nontender, nondistended, no masses or organomegaly  Back exam - full range of motion, no tenderness, palpable spasm or pain on motion  Neurological - alert, oriented, normal speech, no focal findings or movement disorder noted  Musculoskeletal - no joint tenderness, deformity or swelling  Extremities - peripheral pulses normal, no pedal edema, no clubbing or cyanosis  Skin - normal coloration and turgor, no rashes, no suspicious skin lesions noted     Assessment/Plan:     lose weight, routine labs ordered  Diagnoses and all orders for this visit:    1. Routine general medical examination at a health care facility  -     CBC WITH AUTOMATED DIFF  -     LIPID PANEL  -     TSH RFX ON ABNORMAL TO FREE T4  -     METABOLIC PANEL, COMPREHENSIVE  -     UA/M W/RFLX CULTURE, ROUTINE    2. Pap smear for cervical cancer screening    3. Need for shingles vaccine  -     varicella-zoster recombinant, PF, (SHINGRIX, PF,) 50 mcg/0.5 mL susr injection; 0.5 mL by IntraMUSCular route once for 1 dose. 4. History of breast cancer -stable. She has a gynecology visit coming up in the next few days. Will also order breast MRI and mammogram.  -     [facility] 3D ANDRE W MAMMO BI DX INCL CAD; Future  -     MRI BREAST BI W WO CONT; Future    5. Mild depression ([address] -controlled on current meds. Will continue these for now. 10. Well woman exam (no gynecological exam)  Comments:  [V70.0]       Follow-up Disposition:  Return in about 1 year (around 12/3/2019).    Advised her to call back or return to office if symptoms worsen/change/persist.  Discussed expected course/resolution/complications of diagnosis in detail with patient. Medication risks/benefits/costs/interactions/alternatives discussed with patient. She was given an after visit summary which includes diagnoses, current medications, & vitals. She expressed understanding with the diagnosis and plan.

## 2018-12-16 DIAGNOSIS — Z00.00 ROUTINE GENERAL MEDICAL EXAMINATION AT A HEALTH CARE FACILITY: ICD-10-CM

## 2018-12-16 RX ORDER — METFORMIN HYDROCHLORIDE 1000 MG/1
1000 TABLET ORAL DAILY
Qty: 90 TAB | Refills: 1 | Status: SHIPPED | OUTPATIENT
Start: 2018-12-16 | End: 2019-06-07 | Stop reason: SDUPTHER

## 2018-12-16 RX ORDER — DESVENLAFAXINE 100 MG/1
TABLET, EXTENDED RELEASE ORAL
Qty: 90 TAB | Refills: 3 | Status: SHIPPED | OUTPATIENT
Start: 2018-12-16 | End: 2019-11-16 | Stop reason: SDUPTHER

## 2019-03-05 LAB
ALBUMIN SERPL-MCNC: 4 G/DL (ref 3.6–4.8)
ALBUMIN/GLOB SERPL: 1.7 {RATIO} (ref 1.2–2.2)
ALP SERPL-CCNC: 108 IU/L (ref 39–117)
ALT SERPL-CCNC: 14 IU/L (ref 0–32)
APPEARANCE UR: CLEAR
AST SERPL-CCNC: 16 IU/L (ref 0–40)
BACTERIA #/AREA URNS HPF: ABNORMAL /[HPF]
BASOPHILS # BLD AUTO: 0 X10E3/UL (ref 0–0.2)
BASOPHILS NFR BLD AUTO: 0 %
BILIRUB SERPL-MCNC: 0.3 MG/DL (ref 0–1.2)
BILIRUB UR QL STRIP: NEGATIVE
BUN SERPL-MCNC: 13 MG/DL (ref 8–27)
BUN/CREAT SERPL: 18 (ref 12–28)
CALCIUM SERPL-MCNC: 9 MG/DL (ref 8.7–10.3)
CASTS URNS QL MICRO: ABNORMAL /LPF
CHLORIDE SERPL-SCNC: 106 MMOL/L (ref 96–106)
CHOLEST SERPL-MCNC: 223 MG/DL (ref 100–199)
CO2 SERPL-SCNC: 24 MMOL/L (ref 20–29)
COLOR UR: YELLOW
CREAT SERPL-MCNC: 0.72 MG/DL (ref 0.57–1)
EOSINOPHIL # BLD AUTO: 0.3 X10E3/UL (ref 0–0.4)
EOSINOPHIL NFR BLD AUTO: 6 %
EPI CELLS #/AREA URNS HPF: ABNORMAL /HPF
ERYTHROCYTE [DISTWIDTH] IN BLOOD BY AUTOMATED COUNT: 13.9 % (ref 12.3–15.4)
GLOBULIN SER CALC-MCNC: 2.3 G/DL (ref 1.5–4.5)
GLUCOSE SERPL-MCNC: 81 MG/DL (ref 65–99)
GLUCOSE UR QL: NEGATIVE
HCT VFR BLD AUTO: 41.3 % (ref 34–46.6)
HDLC SERPL-MCNC: 79 MG/DL
HGB BLD-MCNC: 13.3 G/DL (ref 11.1–15.9)
HGB UR QL STRIP: ABNORMAL
IMM GRANULOCYTES # BLD AUTO: 0 X10E3/UL (ref 0–0.1)
IMM GRANULOCYTES NFR BLD AUTO: 0 %
KETONES UR QL STRIP: NEGATIVE
LDLC SERPL CALC-MCNC: 133 MG/DL (ref 0–99)
LEUKOCYTE ESTERASE UR QL STRIP: NEGATIVE
LYMPHOCYTES # BLD AUTO: 1.9 X10E3/UL (ref 0.7–3.1)
LYMPHOCYTES NFR BLD AUTO: 35 %
MCH RBC QN AUTO: 30 PG (ref 26.6–33)
MCHC RBC AUTO-ENTMCNC: 32.2 G/DL (ref 31.5–35.7)
MCV RBC AUTO: 93 FL (ref 79–97)
MICRO URNS: ABNORMAL
MONOCYTES # BLD AUTO: 0.4 X10E3/UL (ref 0.1–0.9)
MONOCYTES NFR BLD AUTO: 7 %
MUCOUS THREADS URNS QL MICRO: PRESENT
NEUTROPHILS # BLD AUTO: 2.7 X10E3/UL (ref 1.4–7)
NEUTROPHILS NFR BLD AUTO: 52 %
NITRITE UR QL STRIP: NEGATIVE
PH UR STRIP: 7 [PH] (ref 5–7.5)
PLATELET # BLD AUTO: 269 X10E3/UL (ref 150–379)
POTASSIUM SERPL-SCNC: 4.6 MMOL/L (ref 3.5–5.2)
PROT SERPL-MCNC: 6.3 G/DL (ref 6–8.5)
PROT UR QL STRIP: NEGATIVE
RBC # BLD AUTO: 4.43 X10E6/UL (ref 3.77–5.28)
RBC #/AREA URNS HPF: ABNORMAL /HPF
SODIUM SERPL-SCNC: 144 MMOL/L (ref 134–144)
SP GR UR: 1.02 (ref 1–1.03)
TRIGL SERPL-MCNC: 54 MG/DL (ref 0–149)
TSH SERPL DL<=0.005 MIU/L-ACNC: 1.63 UIU/ML (ref 0.45–4.5)
URINALYSIS REFLEX, 377202: ABNORMAL
UROBILINOGEN UR STRIP-MCNC: 0.2 MG/DL (ref 0.2–1)
VLDLC SERPL CALC-MCNC: 11 MG/DL (ref 5–40)
WBC # BLD AUTO: 5.3 X10E3/UL (ref 3.4–10.8)
WBC #/AREA URNS HPF: ABNORMAL /HPF

## 2019-03-08 ENCOUNTER — TELEPHONE (OUTPATIENT)
Dept: INTERNAL MEDICINE CLINIC | Age: 64
End: 2019-03-08

## 2019-03-08 NOTE — TELEPHONE ENCOUNTER
----- Message from Melody Lopez MD sent at 3/5/2019  1:24 PM EST -----  Notify OK but blood in the urine. Repeat UA.

## 2019-03-08 NOTE — TELEPHONE ENCOUNTER
Spoke with pt in ref to lab results. Ua had a trace of hematuria. Per SRJ, to see urology for f/u. This is a change from his note to repeat a ua as pt has seen urology in the past. Pt doesn't feel she needs to see urology again as she had a full w/o that was negative. Discussed with SRJ, and he said that was fine. Red flags reviewed.

## 2019-04-04 ENCOUNTER — TELEPHONE (OUTPATIENT)
Dept: INTERNAL MEDICINE CLINIC | Age: 64
End: 2019-04-04

## 2019-04-04 RX ORDER — PROMETHAZINE HYDROCHLORIDE 25 MG/1
25 TABLET ORAL
Qty: 5 TAB | Refills: 0 | Status: SHIPPED | OUTPATIENT
Start: 2019-04-04 | End: 2019-12-13 | Stop reason: ALTCHOICE

## 2019-04-04 NOTE — TELEPHONE ENCOUNTER
Spoke with pt and advised that per SRJ will send Phenergan 25 mg to pharm. She is having breast MRI in the am.     Orders Placed This Encounter    promethazine (PHENERGAN) 25 mg tablet     Sig: Take 1 Tab by mouth every six (6) hours as needed for Nausea. Dispense:  5 Tab     Refill:  0     The above orders were approved via [facility] per Dr. Rosamaria Calix, III.

## 2019-04-04 NOTE — TELEPHONE ENCOUNTER
Pt walked in requesting medication \"to make her not throw up. \"  She was unsure of the name, but is having an MRI tomorrow @ 0730 & says she gets sick from the contrast dye.   Please send to [place] on [address] and call when done

## 2019-04-08 RX ORDER — ONDANSETRON 4 MG/1
4 TABLET, ORALLY DISINTEGRATING ORAL
Qty: 10 TAB | Refills: 0 | Status: SHIPPED | OUTPATIENT
Start: 2019-04-08 | End: 2020-12-09 | Stop reason: SDUPTHER

## 2019-06-07 RX ORDER — SULFAMETHOXAZOLE AND TRIMETHOPRIM 800; 160 MG/1; MG/1
1 TABLET ORAL 2 TIMES DAILY
Qty: 6 TAB | Refills: 0 | Status: SHIPPED | OUTPATIENT
Start: 2019-06-07 | End: 2019-06-10 | Stop reason: SDUPTHER

## 2019-06-07 RX ORDER — METFORMIN HYDROCHLORIDE 1000 MG/1
1000 TABLET ORAL 2 TIMES DAILY WITH MEALS
Qty: 180 TAB | Refills: 0 | Status: SHIPPED | OUTPATIENT
Start: 2019-06-07 | End: 2019-12-13 | Stop reason: SDUPTHER

## 2019-06-07 RX ORDER — VALACYCLOVIR HYDROCHLORIDE 1 G/1
2000 TABLET, FILM COATED ORAL 2 TIMES DAILY
Qty: 16 TAB | Refills: 2 | Status: SHIPPED | OUTPATIENT
Start: 2019-06-07 | End: 2019-06-07 | Stop reason: SDUPTHER

## 2019-06-07 RX ORDER — VALACYCLOVIR HYDROCHLORIDE 1 G/1
2000 TABLET, FILM COATED ORAL 2 TIMES DAILY
Qty: 16 TAB | Refills: 2 | Status: SHIPPED | OUTPATIENT
Start: 2019-06-07 | End: 2019-06-08

## 2019-11-16 DIAGNOSIS — Z00.00 ROUTINE GENERAL MEDICAL EXAMINATION AT A HEALTH CARE FACILITY: ICD-10-CM

## 2019-11-17 RX ORDER — DESVENLAFAXINE 100 MG/1
TABLET, EXTENDED RELEASE ORAL
Qty: 30 TAB | Refills: 0 | Status: SHIPPED | OUTPATIENT
Start: 2019-11-17 | End: 2019-12-13 | Stop reason: SDUPTHER

## 2019-12-13 ENCOUNTER — OFFICE VISIT (OUTPATIENT)
Dept: INTERNAL MEDICINE CLINIC | Age: 64
End: 2019-12-13

## 2019-12-13 VITALS
HEART RATE: 74 BPM | DIASTOLIC BLOOD PRESSURE: 84 MMHG | HEIGHT: 65 IN | SYSTOLIC BLOOD PRESSURE: 132 MMHG | RESPIRATION RATE: 16 BRPM | TEMPERATURE: 98.3 F | OXYGEN SATURATION: 96 % | BODY MASS INDEX: 26.82 KG/M2 | WEIGHT: 161 LBS

## 2019-12-13 DIAGNOSIS — Z00.00 ROUTINE GENERAL MEDICAL EXAMINATION AT A HEALTH CARE FACILITY: ICD-10-CM
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
## 2019-12-13 RX ORDER — DESVENLAFAXINE 100 MG/1
TABLET, EXTENDED RELEASE ORAL
Qty: 30 TAB | Refills: 0 | Status: SHIPPED | OUTPATIENT
Start: 2019-12-13 | End: 2019-12-23 | Stop reason: SDUPTHER

## 2019-12-13 RX ORDER — METFORMIN HYDROCHLORIDE 1000 MG/1
1000 TABLET ORAL 2 TIMES DAILY WITH MEALS
Qty: 180 TAB | Refills: 0 | Status: SHIPPED | OUTPATIENT
Start: 2019-12-13 | End: 2020-05-04

## 2019-12-13 NOTE — PATIENT INSTRUCTIONS
According to the Centers for Disease Control and Prevention, as of 2011 Lyme disease is the sixth fastest growing disease in the [place]. Your health care provider has ordered a laboratory test for the presence of Lyme disease for you. Current laboratory testing for Lyme disease can be problematic and standard laboratory tests often result in false negative and false positive results, and if done too early, you may not have produced enough antibodies. If you are tested for Lyme disease, and the results are negative, this does not necessarily mean you do not have Lyme disease. If you continue to experience symptoms, you should contact your health care provider and inquire about the appropriateness of retesting or additional treatment.

## 2019-12-14 LAB — VIT B12 SERPL-MCNC: 486 PG/ML (ref 232–1245)

## 2019-12-14 NOTE — PROGRESS NOTES
Subjective:   59 y.o. female for Well Woman Check. Her gyne and breast care is done elsewhere by her Ob-Gyne physician. Patient Active Problem List    Diagnosis Date Noted    Mild depression ([facility] [address] 12/03/2018    History of breast cancer 11/07/2017    Advance directive discussed with patient 04/20/2016    Vitamin D deficiency 10/04/2012    History of Mastectomy 04/01/2009    History of tonsillectomy 04/01/2009    S/P LASIK Surgery 04/01/2009    Hx of breast reconstruction 04/01/2009    Depression 04/01/2009     Current Outpatient Medications   Medication Sig Dispense Refill    metFORMIN (GLUCOPHAGE) 1,000 mg tablet Take 1 Tab by mouth two (2) times daily (with meals). 180 Tab 0    Desvenlafaxine 100 mg Tb24 TAKE ONE TABLET BY MOUTH DAILY 30 Tab 0    ondansetron (ZOFRAN ODT) 4 mg disintegrating tablet Take 1 Tab by mouth every eight (8) hours as needed for Nausea. 10 Tab 0    ALPRAZolam (XANAX) 0.5 mg tablet Take 1 Tab by mouth nightly as needed for Anxiety. Max Daily Amount: 0.5 mg. 30 Tab 1     No Known Allergies  Past Medical History:   Diagnosis Date    [facility]     Depression 4/1/2009    History of mastectomy 4/1/2009    History of tonsillectomy 4/1/2009    Hx of breast reconstruction 4/1/2009    S/P LASIK surgery 4/1/2009     Past Surgical History:   Procedure Laterality Date    BREAST SURGERY PROCEDURE UNLISTED      HX BREAST RECONSTRUCTION      HX MASTECTOMY      HX REFRACTIVE SURGERY      HX TONSILLECTOMY       Family History   Problem Relation Age of Onset    Cancer Mother         Breast    Heart Failure Mother     Heart Disease Mother     Cancer Father         Brain tumor    Cancer Maternal Grandmother     Psychiatric Disorder Daughter     Psychiatric Disorder Daughter      Social History     Tobacco Use    Smoking status: Never Smoker    Smokeless tobacco: Never Used   Substance Use Topics    Alcohol use:  Yes     Alcohol/week: 2.0 standard drinks     Types: 2 Standard drinks or equivalent per week     Frequency: 4 or more times a week     Drinks per session: 1 or 2        Lab Results   Component Value Date/Time    WBC 5.3 03/04/2019 10:04 AM    HGB 13.3 03/04/2019 10:04 AM    HCT 41.3 03/04/2019 10:04 AM    PLATELET 461 63/70/6725 10:04 AM    MCV 93 03/04/2019 10:04 AM     Lab Results   Component Value Date/Time    Cholesterol, total 223 (H) 03/04/2019 10:04 AM    HDL Cholesterol 79 03/04/2019 10:04 AM    LDL, calculated 133 (H) 03/04/2019 10:04 AM    Triglyceride 54 03/04/2019 10:04 AM     Lab Results   Component Value Date/Time    ALT (SGPT) 14 03/04/2019 10:04 AM    AST (SGOT) 16 03/04/2019 10:04 AM    Alk. phosphatase 108 03/04/2019 10:04 AM    Bilirubin, total 0.3 03/04/2019 10:04 AM    Albumin 4.0 03/04/2019 10:04 AM    Protein, total 6.3 03/04/2019 10:04 AM    PLATELET 865 76/63/7282 10:04 AM    Hepatitis C RNA, QL, ANNEL Negative 12/13/2016 09:02 AM     Lab Results   Component Value Date/Time    GFR est non-AA 89 03/04/2019 10:04 AM    GFR est  03/04/2019 10:04 AM    Creatinine 0.72 03/04/2019 10:04 AM    BUN 13 03/04/2019 10:04 AM    Sodium 144 03/04/2019 10:04 AM    Potassium 4.6 03/04/2019 10:04 AM    Chloride 106 03/04/2019 10:04 AM    CO2 24 03/04/2019 10:04 AM     Lab Results   Component Value Date/Time    TSH 1.630 03/04/2019 10:04 AM    TSH 1.400 03/15/2018 02:17 PM    T4, Free 1.15 03/15/2018 02:17 PM      Lab Results   Component Value Date/Time    Glucose 81 03/04/2019 10:04 AM         Specific concerns today: Ongoing issues with her weight. .    Review of Systems  A comprehensive review of systems was negative except for that written in the HPI. Objective:   Blood pressure 132/84, pulse 74, temperature 98.3 °F (36.8 °C), temperature source Oral, resp. rate 16, height 5' 4.5\" (1.638 m), weight 161 lb (73 kg), SpO2 96 %.    Physical Examination:   General appearance - alert, well appearing, and in no distress  Eyes - pupils equal and reactive, extraocular eye movements intact  Ears - bilateral TM's and external ear canals normal  Nose - normal and patent, no erythema, discharge or polyps  Mouth - mucous membranes moist, pharynx normal without lesions  Neck - supple, no significant adenopathy  Lymphatics - no palpable lymphadenopathy, no hepatosplenomegaly  Chest - clear to auscultation, no wheezes, rales or rhonchi, symmetric air entry  Heart - normal rate, regular rhythm, normal S1, S2, no murmurs, rubs, clicks or gallops  Abdomen - soft, nontender, nondistended, no masses or organomegaly  Back exam - full range of motion, no tenderness, palpable spasm or pain on motion  Neurological - alert, oriented, normal speech, no focal findings or movement disorder noted  Musculoskeletal - no joint tenderness, deformity or swelling  Extremities - peripheral pulses normal, no pedal edema, no clubbing or cyanosis     Assessment/Plan:     lose weight, increase physical activity, continue present plan, routine labs ordered  Diagnoses and all orders for this visit:    1. Tick bite, initial encounter -multiple bites in [place] this summer. 1 of which she did have a red rash around. Will check Lyme titers to be sure they are stable.  -     LYME AB TOTAL W/RFLX W BLOT    2. Routine general medical examination at a health care facility  -     Desvenlafaxine 100 mg Tb24; TAKE ONE TABLET BY MOUTH DAILY  -     VITAMIN B12  3. Breast cancerin remission. 4.  Obesitywe will work on diet and exercise for better control of that. Continue metformin as well. Other orders  -     metFORMIN (GLUCOPHAGE) 1,000 mg tablet; Take 1 Tab by mouth two (2) times daily (with meals).

## 2019-12-16 LAB — B BURGDOR IGG+IGM SER-ACNC: <0.91 ISR (ref 0–0.9)

## 2019-12-23 DIAGNOSIS — Z00.00 ROUTINE GENERAL MEDICAL EXAMINATION AT A HEALTH CARE FACILITY: ICD-10-CM

## 2019-12-23 RX ORDER — DESVENLAFAXINE 100 MG/1
1 TABLET, EXTENDED RELEASE ORAL DAILY
Qty: 90 TAB | Refills: 1 | Status: SHIPPED | OUTPATIENT
Start: 2019-12-23 | End: 2020-07-04 | Stop reason: SDUPTHER

## 2019-12-23 NOTE — TELEPHONE ENCOUNTER
Orders Placed This Encounter    Desvenlafaxine 100 mg Tb24     Sig: Take 1 Tab by mouth daily. TAKE ONE TABLET BY MOUTH DAILY     Dispense:  90 Tab     Refill:  1     The above orders were approved via [facility] per Dr. Celestina Lennox, III.

## 2020-05-04 RX ORDER — METFORMIN HYDROCHLORIDE 1000 MG/1
1000 TABLET ORAL 2 TIMES DAILY WITH MEALS
Qty: 180 TAB | Refills: 0 | Status: SHIPPED | OUTPATIENT
Start: 2020-05-04 | End: 2020-12-09 | Stop reason: SDUPTHER

## 2020-05-04 RX ORDER — METFORMIN HYDROCHLORIDE 1000 MG/1
TABLET ORAL
Qty: 180 TAB | Refills: 0 | Status: SHIPPED | OUTPATIENT
Start: 2020-05-04 | End: 2020-05-04 | Stop reason: SDUPTHER

## 2020-05-20 RX ORDER — ALBUTEROL SULFATE 90 UG/1
2 AEROSOL, METERED RESPIRATORY (INHALATION)
Qty: 1 INHALER | Refills: 1 | Status: SHIPPED | OUTPATIENT
Start: 2020-05-20 | End: 2021-12-22 | Stop reason: ALTCHOICE

## 2020-07-04 DIAGNOSIS — Z00.00 ROUTINE GENERAL MEDICAL EXAMINATION AT A HEALTH CARE FACILITY: ICD-10-CM

## 2020-07-04 RX ORDER — METHYLPREDNISOLONE 4 MG/1
TABLET ORAL
Qty: 1 DOSE PACK | Refills: 0 | Status: SHIPPED | OUTPATIENT
Start: 2020-07-04 | End: 2020-12-09 | Stop reason: ALTCHOICE

## 2020-07-04 RX ORDER — DESVENLAFAXINE 100 MG/1
1 TABLET, EXTENDED RELEASE ORAL DAILY
Qty: 90 TAB | Refills: 1 | Status: SHIPPED | OUTPATIENT
Start: 2020-07-04 | End: 2020-12-09 | Stop reason: SDUPTHER

## 2020-11-12 DIAGNOSIS — C50.912 MALIGNANT NEOPLASM OF LEFT FEMALE BREAST, UNSPECIFIED ESTROGEN RECEPTOR STATUS, UNSPECIFIED SITE OF BREAST (HCC): Primary | ICD-10-CM

## 2020-12-09 ENCOUNTER — OFFICE VISIT (OUTPATIENT)
Dept: INTERNAL MEDICINE CLINIC | Age: 65
End: 2020-12-09
Payer: MEDICARE

## 2020-12-09 VITALS
TEMPERATURE: 96.9 F | WEIGHT: 165.8 LBS | OXYGEN SATURATION: 100 % | RESPIRATION RATE: 16 BRPM | BODY MASS INDEX: 27.63 KG/M2 | SYSTOLIC BLOOD PRESSURE: 144 MMHG | DIASTOLIC BLOOD PRESSURE: 103 MMHG | HEIGHT: 65 IN | HEART RATE: 78 BPM

## 2020-12-09 DIAGNOSIS — R63.5 WEIGHT GAIN: ICD-10-CM

## 2020-12-09 DIAGNOSIS — R68.89 OTHER GENERAL SYMPTOMS AND SIGNS: ICD-10-CM

## 2020-12-09 DIAGNOSIS — Z90.12 HISTORY OF LEFT MASTECTOMY: ICD-10-CM

## 2020-12-09 DIAGNOSIS — Z00.00 WELCOME TO MEDICARE PREVENTIVE VISIT: Primary | ICD-10-CM

## 2020-12-09 DIAGNOSIS — E78.2 MIXED HYPERLIPIDEMIA: ICD-10-CM

## 2020-12-09 DIAGNOSIS — Z78.0 MENOPAUSE: ICD-10-CM

## 2020-12-09 DIAGNOSIS — Z00.00 ROUTINE GENERAL MEDICAL EXAMINATION AT A HEALTH CARE FACILITY: ICD-10-CM

## 2020-12-09 DIAGNOSIS — E55.9 VITAMIN D DEFICIENCY: ICD-10-CM

## 2020-12-09 DIAGNOSIS — Z85.3 HISTORY OF BREAST CANCER: ICD-10-CM

## 2020-12-09 DIAGNOSIS — F32.A MILD DEPRESSION: ICD-10-CM

## 2020-12-09 PROCEDURE — G0402 INITIAL PREVENTIVE EXAM: HCPCS | Performed by: INTERNAL MEDICINE

## 2020-12-09 PROCEDURE — G8536 NO DOC ELDER MAL SCRN: HCPCS | Performed by: INTERNAL MEDICINE

## 2020-12-09 PROCEDURE — G0463 HOSPITAL OUTPT CLINIC VISIT: HCPCS | Performed by: INTERNAL MEDICINE

## 2020-12-09 PROCEDURE — 1090F PRES/ABSN URINE INCON ASSESS: CPT | Performed by: INTERNAL MEDICINE

## 2020-12-09 PROCEDURE — G9717 DOC PT DX DEP/BP F/U NT REQ: HCPCS | Performed by: INTERNAL MEDICINE

## 2020-12-09 PROCEDURE — G8419 CALC BMI OUT NRM PARAM NOF/U: HCPCS | Performed by: INTERNAL MEDICINE

## 2020-12-09 PROCEDURE — G8427 DOCREV CUR MEDS BY ELIG CLIN: HCPCS | Performed by: INTERNAL MEDICINE

## 2020-12-09 PROCEDURE — 99214 OFFICE O/P EST MOD 30 MIN: CPT | Performed by: INTERNAL MEDICINE

## 2020-12-09 PROCEDURE — G9899 SCRN MAM PERF RSLTS DOC: HCPCS | Performed by: INTERNAL MEDICINE

## 2020-12-09 PROCEDURE — 3017F COLORECTAL CA SCREEN DOC REV: CPT | Performed by: INTERNAL MEDICINE

## 2020-12-09 PROCEDURE — 1101F PT FALLS ASSESS-DOCD LE1/YR: CPT | Performed by: INTERNAL MEDICINE

## 2020-12-09 PROCEDURE — G0403 EKG FOR INITIAL PREVENT EXAM: HCPCS | Performed by: INTERNAL MEDICINE

## 2020-12-09 PROCEDURE — G8399 PT W/DXA RESULTS DOCUMENT: HCPCS | Performed by: INTERNAL MEDICINE

## 2020-12-09 RX ORDER — DESVENLAFAXINE 100 MG/1
1 TABLET, EXTENDED RELEASE ORAL DAILY
Qty: 90 TAB | Refills: 3 | Status: SHIPPED | OUTPATIENT
Start: 2020-12-09 | End: 2021-12-22 | Stop reason: SDUPTHER

## 2020-12-09 RX ORDER — ONDANSETRON 4 MG/1
4 TABLET, ORALLY DISINTEGRATING ORAL
Qty: 10 TAB | Refills: 0 | Status: SHIPPED | OUTPATIENT
Start: 2020-12-09 | End: 2021-12-22 | Stop reason: SDUPTHER

## 2020-12-09 RX ORDER — METFORMIN HYDROCHLORIDE 1000 MG/1
1000 TABLET ORAL 2 TIMES DAILY WITH MEALS
Qty: 180 TAB | Refills: 0 | Status: SHIPPED | OUTPATIENT
Start: 2020-12-09 | End: 2021-12-22 | Stop reason: SDUPTHER

## 2020-12-09 NOTE — PROGRESS NOTES
This is a \"Welcome to [employer]"  Initial Preventive Physical Examination (IPPE) providing Personalized Prevention Plan Services (Performed in the first 12 months of enrollment)    I have reviewed the patient's medical history in detail and updated the computerized patient record. Also for a follow-up of her health issues. She has not been exercising regularly. Her weight is up slightly. She does feel that her depression is well controlled on her current meds. She has gotten her mammogram done but is now due for a breast MRI. She is also past due for her gynecology appointment. No unusual muscle aches or joint aches. No rashes.     ROS  - Per HPI    Physical Examination: General appearance - alert, well appearing, and in no distress  Ears - bilateral TM's and external ear canals normal  Nose - normal and patent, no erythema, discharge or polyps  Mouth - mucous membranes moist, pharynx normal without lesions  Neck - supple, no significant adenopathy  Lymphatics - no palpable lymphadenopathy, no hepatosplenomegaly  Chest - clear to auscultation, no wheezes, rales or rhonchi, symmetric air entry  Heart - normal rate and regular rhythm  Abdomen - soft, nontender, nondistended, no masses or organomegaly  Back exam - full range of motion, no tenderness, palpable spasm or pain on motion  Neurological - alert, oriented, normal speech, no focal findings or movement disorder noted  Musculoskeletal - no joint tenderness, deformity or swelling  Extremities - peripheral pulses normal, no pedal edema, no clubbing or cyanosis      Depression Risk Screen     3 most recent PHQ Screens 12/9/2020   Little interest or pleasure in doing things Not at all   Feeling down, depressed, irritable, or hopeless Not at all   Total Score PHQ 2 0       Alcohol Risk Screen   Do you average more than 1 drink per night or more than 7 drinks a week:  No    On any one occasion in the past three months have you have had more than 3 drinks containing alcohol:  No          Functional Ability and Level of Safety   Diet: No special diet     Hearing: Hearing is good. Vision Screening:  Vision is good. No exam data present     Activities of Daily Living: The home contains: no safety equipment. Patient does total self care     Ambulation: with no difficulty     Exercise level: moderately active     Fall Risk Screen:  Fall Risk Assessment, last 12 mths 12/9/2020   Able to walk? Yes   Fall in past 12 months? No     Abuse Screen:  Patient is not abused       Screening EKG   EKG order placed: Yes    End of Life Planning   Advanced care planning directives were discussed with the patient and /or family/caregiver. Assessment/Plan   Education and counseling provided:  Are appropriate based on today's review and evaluation  End-of-Life planning (with patient's consent)  Diabetes screening test    Diagnoses and all orders for this visit:    1. History of left mastectomy    2. Mild depression (HCC)well-controlled on current meds. Will continue these for now. 3. Vitamin D deficiencyrepleted on previous labs. 4. History of breast cancerto obtain breast MRI as planned. Also due for a bone density as her previous one showed osteopenia. 5. Welcome to Medicare preventive visit  -     AMB POC EKG ROUTINE W/ 12 LEADS, SCREEN ()    Other orders  -     metFORMIN (GLUCOPHAGE) 1,000 mg tablet; Take 1 Tab by mouth two (2) times daily (with meals). -     CBC WITH AUTOMATED DIFF; Future  -     LIPID PANEL; Future  -     METABOLIC PANEL, COMPREHENSIVE; Future  -     TSH 3RD GENERATION; Future  -     REFERRAL TO [facility] CLINICAL SPECIALIST  -     DEXA BONE DENSITY STUDY AXIAL;  Future        Health Maintenance Due     Health Maintenance Due   Topic Date Due    Shingrix Vaccine Age 49> (1 of 2) 09/07/2005    Breast Cancer Screen Mammogram  04/04/2020    GLAUCOMA SCREENING Q2Y  09/07/2020       Patient Care Team   Patient Care Team:  Alisa Medel MD as PCP - General  Oneil Arreola MD as PCP - [facility]an[facility]    History     Past Medical History:   Diagnosis Date    [facility])     Depression 4/1/2009    History of mastectomy 4/1/2009    History of tonsillectomy 4/1/2009    Hx of breast reconstruction 4/1/2009    S/P LASIK surgery 4/1/2009      Past Surgical History:   Procedure Laterality Date    BREAST SURGERY PROCEDURE UNLISTED      HX BREAST RECONSTRUCTION      HX MASTECTOMY      HX REFRACTIVE SURGERY      HX TONSILLECTOMY       Current Outpatient Medications   Medication Sig Dispense Refill    Desvenlafaxine 100 mg Tb24 Take 1 Tab by mouth daily. TAKE ONE TABLET BY MOUTH DAILY 90 Tab 1    metFORMIN (GLUCOPHAGE) 1,000 mg tablet Take 1 Tab by mouth two (2) times daily (with meals). 180 Tab 0    ondansetron (ZOFRAN ODT) 4 mg disintegrating tablet Take 1 Tab by mouth every eight (8) hours as needed for Nausea. 10 Tab 0    ALPRAZolam (XANAX) 0.5 mg tablet Take 1 Tab by mouth nightly as needed for Anxiety. Max Daily Amount: 0.5 mg. 30 Tab 1    albuterol (PROVENTIL HFA, VENTOLIN HFA, PROAIR HFA) 90 mcg/actuation inhaler Take 2 Puffs by inhalation every six (6) hours as needed for Wheezing. 1 Inhaler 1     No Known Allergies    Family History   Problem Relation Age of Onset    Cancer Mother         Breast    Heart Failure Mother     Heart Disease Mother     Cancer Father         Brain tumor    Cancer Maternal Grandmother     Psychiatric Disorder Daughter     Psychiatric Disorder Daughter      Social History     Tobacco Use    Smoking status: Never Smoker    Smokeless tobacco: Never Used   Substance Use Topics    Alcohol use:  Yes     Alcohol/week: 6.0 standard drinks     Types: 6 Standard drinks or equivalent per week     Frequency: 4 or more times a week     Drinks per session: 1 or 2

## 2020-12-09 NOTE — PATIENT INSTRUCTIONS
Medicare Wellness Visit, Female The best way to live healthy is to have a lifestyle where you eat a well-balanced diet, exercise regularly, limit alcohol use, and quit all forms of tobacco/nicotine, if applicable. Regular preventive services are another way to keep healthy. Preventive services (vaccines, screening tests, monitoring & exams) can help personalize your care plan, which helps you manage your own care. Screening tests can find health problems at the earliest stages, when they are easiest to treat. [facility] follows the current, evidence-based guidelines published by the [facility] Jeevan Devries ([facility]) when recommending preventive services for our patients. Because we follow these guidelines, sometimes recommendations change over time as research supports it. (For example, mammograms used to be recommended annually. Even though Medicare will still pay for an annual mammogram, the newer guidelines recommend a mammogram every two years for women of average risk). Of course, you and your doctor may decide to screen more often for some diseases, based on your risk and your co-morbidities (chronic disease you are already diagnosed with). Preventive services for you include: - Medicare offers their members a free annual wellness visit, which is time for you and your primary care provider to discuss and plan for your preventive service needs. Take advantage of this benefit every year! 
-All adults over the age of 72 should receive the recommended pneumonia vaccines. Current USPSTF guidelines recommend a series of two vaccines for the best pneumonia protection.  
-All adults should have a flu vaccine yearly and a tetanus vaccine every 10 years.  
-All adults age 48 and older should receive the shingles vaccines (series of two vaccines). -All adults age 38-68 who are overweight should have a diabetes screening test once every three years. -All adults born between [birth year] and 1965 should be screened once for Hepatitis C. 
-Other screening tests and preventive services for persons with diabetes include: an eye exam to screen for diabetic retinopathy, a kidney function test, a foot exam, and stricter control over your cholesterol.  
-Cardiovascular screening for adults with routine risk involves an electrocardiogram (ECG) at intervals determined by your doctor.  
-Colorectal cancer screenings should be done for adults age 54-65 with no increased risk factors for colorectal cancer. There are a number of acceptable methods of screening for this type of cancer. Each test has its own benefits and drawbacks. Discuss with your doctor what is most appropriate for you during your annual wellness visit. The different tests include: colonoscopy (considered the best screening method), a fecal occult blood test, a fecal DNA test, and sigmoidoscopy. 
 
-A bone mass density test is recommended when a woman turns 65 to screen for osteoporosis. This test is only recommended one time, as a screening. Some providers will use this same test as a disease monitoring tool if you already have osteoporosis. -Breast cancer screenings are recommended every other year for women of normal risk, age 54-69. 
-Cervical cancer screenings for women over age 72 are only recommended with certain risk factors. Here is a list of your current Health Maintenance items (your personalized list of preventive services) with a due date: 
Health Maintenance Due Topic Date Due  Shingles Vaccine (1 of 2) 09/07/2005  Mammogram  04/04/2020  Glaucoma Screening   09/07/2020

## 2020-12-14 ENCOUNTER — APPOINTMENT (OUTPATIENT)
Dept: INTERNAL MEDICINE CLINIC | Age: 65
End: 2020-12-14

## 2020-12-14 DIAGNOSIS — R68.89 OTHER GENERAL SYMPTOMS AND SIGNS: ICD-10-CM

## 2020-12-14 DIAGNOSIS — E78.2 MIXED HYPERLIPIDEMIA: ICD-10-CM

## 2020-12-14 DIAGNOSIS — F32.A MILD DEPRESSION: ICD-10-CM

## 2020-12-14 DIAGNOSIS — Z78.0 MENOPAUSE: ICD-10-CM

## 2020-12-14 DIAGNOSIS — E55.9 VITAMIN D DEFICIENCY: ICD-10-CM

## 2020-12-14 DIAGNOSIS — R63.5 WEIGHT GAIN: ICD-10-CM

## 2020-12-14 LAB
ALBUMIN SERPL-MCNC: 3.8 G/DL (ref 3.5–5)
ALBUMIN/GLOB SERPL: 1.4 {RATIO} (ref 1.1–2.2)
ALP SERPL-CCNC: 130 U/L (ref 45–117)
ALT SERPL-CCNC: 16 U/L (ref 12–78)
ANION GAP SERPL CALC-SCNC: 5 MMOL/L (ref 5–15)
AST SERPL-CCNC: 16 U/L (ref 15–37)
BASOPHILS # BLD: 0 K/UL (ref 0–0.1)
BASOPHILS NFR BLD: 1 % (ref 0–1)
BILIRUB SERPL-MCNC: 0.4 MG/DL (ref 0.2–1)
BUN SERPL-MCNC: 11 MG/DL (ref 6–20)
BUN/CREAT SERPL: 16 (ref 12–20)
CALCIUM SERPL-MCNC: 8.6 MG/DL (ref 8.5–10.1)
CHLORIDE SERPL-SCNC: 111 MMOL/L (ref 97–108)
CHOLEST SERPL-MCNC: 274 MG/DL
CO2 SERPL-SCNC: 26 MMOL/L (ref 21–32)
CREAT SERPL-MCNC: 0.67 MG/DL (ref 0.55–1.02)
DIFFERENTIAL METHOD BLD: NORMAL
EOSINOPHIL # BLD: 0.3 K/UL (ref 0–0.4)
EOSINOPHIL NFR BLD: 5 % (ref 0–7)
ERYTHROCYTE [DISTWIDTH] IN BLOOD BY AUTOMATED COUNT: 12.6 % (ref 11.5–14.5)
GLOBULIN SER CALC-MCNC: 2.8 G/DL (ref 2–4)
GLUCOSE SERPL-MCNC: 90 MG/DL (ref 65–100)
HCT VFR BLD AUTO: 41.4 % (ref 35–47)
HDLC SERPL-MCNC: 73 MG/DL
HDLC SERPL: 3.8 {RATIO} (ref 0–5)
HGB BLD-MCNC: 13.6 G/DL (ref 11.5–16)
IMM GRANULOCYTES # BLD AUTO: 0 K/UL (ref 0–0.04)
IMM GRANULOCYTES NFR BLD AUTO: 0 % (ref 0–0.5)
LDLC SERPL CALC-MCNC: 177.8 MG/DL (ref 0–100)
LIPID PROFILE,FLP: ABNORMAL
LYMPHOCYTES # BLD: 2 K/UL (ref 0.8–3.5)
LYMPHOCYTES NFR BLD: 39 % (ref 12–49)
MCH RBC QN AUTO: 29.7 PG (ref 26–34)
MCHC RBC AUTO-ENTMCNC: 32.9 G/DL (ref 30–36.5)
MCV RBC AUTO: 90.4 FL (ref 80–99)
MONOCYTES # BLD: 0.4 K/UL (ref 0–1)
MONOCYTES NFR BLD: 7 % (ref 5–13)
NEUTS SEG # BLD: 2.6 K/UL (ref 1.8–8)
NEUTS SEG NFR BLD: 48 % (ref 32–75)
NRBC # BLD: 0 K/UL (ref 0–0.01)
NRBC BLD-RTO: 0 PER 100 WBC
PLATELET # BLD AUTO: 289 K/UL (ref 150–400)
PMV BLD AUTO: 9.9 FL (ref 8.9–12.9)
POTASSIUM SERPL-SCNC: 4 MMOL/L (ref 3.5–5.1)
PROT SERPL-MCNC: 6.6 G/DL (ref 6.4–8.2)
RBC # BLD AUTO: 4.58 M/UL (ref 3.8–5.2)
SODIUM SERPL-SCNC: 142 MMOL/L (ref 136–145)
T4 SERPL-MCNC: 6.2 UG/DL (ref 4.8–13.9)
TRIGL SERPL-MCNC: 116 MG/DL (ref ?–150)
TSH SERPL DL<=0.05 MIU/L-ACNC: 1.69 UIU/ML (ref 0.36–3.74)
VIT B12 SERPL-MCNC: 315 PG/ML (ref 193–986)
VLDLC SERPL CALC-MCNC: 23.2 MG/DL
WBC # BLD AUTO: 5.2 K/UL (ref 3.6–11)

## 2021-01-26 ENCOUNTER — IMMUNIZATION (OUTPATIENT)
Dept: INTERNAL MEDICINE CLINIC | Age: 66
End: 2021-01-26
Payer: MEDICARE

## 2021-01-26 DIAGNOSIS — Z23 ENCOUNTER FOR IMMUNIZATION: Primary | ICD-10-CM

## 2021-01-26 PROCEDURE — 91301 COVID-19, MRNA, LNP-S, PF, 100MCG/0.5ML DOSE(MODERNA): CPT | Performed by: FAMILY MEDICINE

## 2021-01-26 PROCEDURE — 0011A PR IMM ADMN SARSCOV2 100 MCG/0.5 ML 1ST DOSE: CPT | Performed by: FAMILY MEDICINE

## 2021-02-23 ENCOUNTER — IMMUNIZATION (OUTPATIENT)
Dept: INTERNAL MEDICINE CLINIC | Age: 66
End: 2021-02-23
Payer: MEDICARE

## 2021-02-23 DIAGNOSIS — Z23 ENCOUNTER FOR IMMUNIZATION: Primary | ICD-10-CM

## 2021-02-23 PROCEDURE — 91301 COVID-19, MRNA, LNP-S, PF, 100MCG/0.5ML DOSE(MODERNA): CPT | Performed by: FAMILY MEDICINE

## 2021-02-23 PROCEDURE — 0012A COVID-19, MRNA, LNP-S, PF, 100MCG/0.5ML DOSE(MODERNA): CPT | Performed by: FAMILY MEDICINE

## 2021-04-28 DIAGNOSIS — Z78.0 MENOPAUSE: ICD-10-CM

## 2021-06-14 ENCOUNTER — E-VISIT (OUTPATIENT)
Dept: INTERNAL MEDICINE CLINIC | Age: 66
End: 2021-06-14
Payer: MEDICARE

## 2021-06-14 DIAGNOSIS — L24.7 IRRITANT CONTACT DERMATITIS DUE TO PLANTS, EXCEPT FOOD: Primary | ICD-10-CM

## 2021-06-14 PROCEDURE — 99421 OL DIG E/M SVC 5-10 MIN: CPT | Performed by: INTERNAL MEDICINE

## 2021-06-15 RX ORDER — METHYLPREDNISOLONE 4 MG/1
TABLET ORAL
Qty: 1 DOSE PACK | Refills: 0 | Status: SHIPPED | OUTPATIENT
Start: 2021-06-15 | End: 2021-12-22 | Stop reason: ALTCHOICE

## 2021-06-15 NOTE — TELEPHONE ENCOUNTER
E-Visit Note:    HPI: per patient questionnaire, this has been reviewed  EXAM: n/a    ----------------------------------  Diagnoses and all orders for this visit:    1. Irritant contact dermatitis due to plants, except food    Other orders  -     methylPREDNISolone (MEDROL DOSEPACK) 4 mg tablet; As directed         PLAN:   Orders Placed This Encounter    methylPREDNISolone (MEDROL DOSEPACK) 4 mg tablet     Sig: As directed     Dispense:  1 Dose Pack     Refill:  0         ----------------------------------  The patient was advised to call if these symptoms worsen or fail to improve as anticipated. 5-10 minutes were spent on the digital evaluation and management of this patient.        Thor Oviedo MD

## 2021-12-22 ENCOUNTER — OFFICE VISIT (OUTPATIENT)
Dept: INTERNAL MEDICINE CLINIC | Age: 66
End: 2021-12-22
Payer: MEDICARE

## 2021-12-22 VITALS
RESPIRATION RATE: 16 BRPM | SYSTOLIC BLOOD PRESSURE: 140 MMHG | TEMPERATURE: 97.8 F | OXYGEN SATURATION: 95 % | DIASTOLIC BLOOD PRESSURE: 98 MMHG | HEART RATE: 77 BPM | WEIGHT: 172.8 LBS | BODY MASS INDEX: 28.79 KG/M2 | HEIGHT: 65 IN

## 2021-12-22 DIAGNOSIS — Z00.00 ROUTINE GENERAL MEDICAL EXAMINATION AT A HEALTH CARE FACILITY: ICD-10-CM

## 2021-12-22 DIAGNOSIS — E78.2 MIXED HYPERLIPIDEMIA: ICD-10-CM

## 2021-12-22 DIAGNOSIS — Z85.3 HISTORY OF BREAST CANCER: ICD-10-CM

## 2021-12-22 DIAGNOSIS — F32.9 REACTIVE DEPRESSION: ICD-10-CM

## 2021-12-22 DIAGNOSIS — Z00.00 MEDICARE ANNUAL WELLNESS VISIT, SUBSEQUENT: Primary | ICD-10-CM

## 2021-12-22 DIAGNOSIS — S40.861A TICK BITE OF RIGHT UPPER ARM, INITIAL ENCOUNTER: ICD-10-CM

## 2021-12-22 DIAGNOSIS — F32.A MILD DEPRESSION: ICD-10-CM
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
## 2021-12-22 PROCEDURE — G8536 NO DOC ELDER MAL SCRN: HCPCS | Performed by: INTERNAL MEDICINE

## 2021-12-22 PROCEDURE — G8399 PT W/DXA RESULTS DOCUMENT: HCPCS | Performed by: INTERNAL MEDICINE

## 2021-12-22 PROCEDURE — 99213 OFFICE O/P EST LOW 20 MIN: CPT | Performed by: INTERNAL MEDICINE

## 2021-12-22 PROCEDURE — 3017F COLORECTAL CA SCREEN DOC REV: CPT | Performed by: INTERNAL MEDICINE

## 2021-12-22 PROCEDURE — G0463 HOSPITAL OUTPT CLINIC VISIT: HCPCS | Performed by: INTERNAL MEDICINE

## 2021-12-22 PROCEDURE — G0439 PPPS, SUBSEQ VISIT: HCPCS | Performed by: INTERNAL MEDICINE

## 2021-12-22 PROCEDURE — G8427 DOCREV CUR MEDS BY ELIG CLIN: HCPCS | Performed by: INTERNAL MEDICINE

## 2021-12-22 PROCEDURE — 1090F PRES/ABSN URINE INCON ASSESS: CPT | Performed by: INTERNAL MEDICINE

## 2021-12-22 PROCEDURE — G9717 DOC PT DX DEP/BP F/U NT REQ: HCPCS | Performed by: INTERNAL MEDICINE

## 2021-12-22 PROCEDURE — G9899 SCRN MAM PERF RSLTS DOC: HCPCS | Performed by: INTERNAL MEDICINE

## 2021-12-22 PROCEDURE — 1101F PT FALLS ASSESS-DOCD LE1/YR: CPT | Performed by: INTERNAL MEDICINE

## 2021-12-22 PROCEDURE — G8419 CALC BMI OUT NRM PARAM NOF/U: HCPCS | Performed by: INTERNAL MEDICINE

## 2021-12-22 RX ORDER — METFORMIN HYDROCHLORIDE 1000 MG/1
1000 TABLET ORAL 2 TIMES DAILY WITH MEALS
Qty: 180 TABLET | Refills: 0 | Status: SHIPPED | OUTPATIENT
Start: 2021-12-22 | End: 2022-03-31

## 2021-12-22 RX ORDER — DESVENLAFAXINE 100 MG/1
1 TABLET, EXTENDED RELEASE ORAL DAILY
Qty: 90 TABLET | Refills: 3 | Status: SHIPPED | OUTPATIENT
Start: 2021-12-22 | End: 2022-07-16 | Stop reason: SDUPTHER

## 2021-12-22 RX ORDER — ONDANSETRON 4 MG/1
4 TABLET, ORALLY DISINTEGRATING ORAL
Qty: 4 TABLET | Refills: 1 | Status: SHIPPED | OUTPATIENT
Start: 2021-12-22

## 2021-12-22 RX ORDER — ALPRAZOLAM 0.5 MG/1
0.5 TABLET ORAL
Qty: 30 TABLET | Refills: 1 | Status: SHIPPED | OUTPATIENT
Start: 2021-12-22 | End: 2022-01-05 | Stop reason: SDUPTHER

## 2021-12-22 NOTE — PROGRESS NOTES
Chief Complaint   Patient presents with   Abdirahman Connolly Annual Wellness Visit       1. Have you been to the ER, urgent care clinic since your last visit? Hospitalized since your last visit? No    2. Have you seen or consulted any other health care providers outside of the [address] [place] [address] since your last visit? Include any pap smears or colon screening.  No

## 2021-12-23 NOTE — PROGRESS NOTES
This is the Subsequent Medicare Annual Wellness Exam, performed 12 months or more after the Initial AWV or the last Subsequent AWV    I have reviewed the patient's medical history in detail and updated the computerized patient record. AS well as a follow up of her health issues. Her depression is under good control. She is due for a breast MRI now and it is scheduled. She was very later for her appt and her BP was elevated when she was confronted at the . Her weight is up and she is not exercising. EMPERATRIZ - Per HPI. Has had multiple tick bites over the summer in [place] and she is concerned about lyme. Physical Examination: General appearance - alert, well appearing, and in no distress  Ears - bilateral TM's and external ear canals normal  Mouth - mucous membranes moist, pharynx normal without lesions  Neck - supple, no significant adenopathy  Lymphatics - no palpable lymphadenopathy, no hepatosplenomegaly  Chest - clear to auscultation, no wheezes, rales or rhonchi, symmetric air entry  Heart - normal rate, regular rhythm, normal S1, S2, no murmurs, rubs, clicks or gallops  Abdomen - soft, nontender, nondistended, no masses or organomegaly  Neurological - alert, oriented, normal speech, no focal findings or movement disorder noted  Musculoskeletal - no joint tenderness, deformity or swelling  Extremities - peripheral pulses normal, no pedal edema, no clubbing or cyanosis        Assessment/Plan   Education and counseling provided:  Are appropriate based on today's review and evaluation  Diabetes screening test    1. Reactive depression - stable  2. Mild depression ([address]  3. History of breast cancer - agree with MRI as planned. 4. Routine general medical examination at a health care facility  -     Desvenlafaxine 100 mg Tb24; Take 1 Tablet by mouth daily. TAKE ONE TABLET BY MOUTH DAILY, Normal, Disp-90 Tablet, R-3  -     ALPRAZolam (XANAX) 0.5 mg tablet;  Take 1 Tablet by mouth nightly as needed for Anxiety. Max Daily Amount: 0.5 mg., Normal, Disp-30 Tablet, R-1This request is for a new prescription for a controlled substance as required by Federal/State law. .  5. Mixed hyperlipidemia - check levels and consider meds as needed. -     METABOLIC PANEL, COMPREHENSIVE; Future  -     CBC WITH AUTOMATED DIFF; Future  -     TSH 3RD GENERATION; Future  -     LIPID PANEL; Future  6. Tick bite of right upper arm, initial encounter  -     LYME AB, IGG & IGM BY WB; Future  7. Medicare annual wellness visit, subsequent  8. Transient HTN - will check BP at home and send me readings in 2 weeks. Depression Risk Factor Screening     3 most recent PHQ Screens 12/22/2021   Little interest or pleasure in doing things Not at all   Feeling down, depressed, irritable, or hopeless Not at all   Total Score PHQ 2 0       Alcohol & Drug Abuse Risk Screen    Do you average more than 1 drink per night or more than 7 drinks a week:  No    On any one occasion in the past three months have you have had more than 3 drinks containing alcohol:  No          Functional Ability and Level of Safety    Hearing: Hearing is good. Activities of Daily Living: The home contains: no safety equipment. Patient does total self care      Ambulation: with no difficulty     Fall Risk:  Fall Risk Assessment, last 12 mths 12/22/2021   Able to walk? Yes   Fall in past 12 months? 1   Do you feel unsteady?  0   Are you worried about falling 0   Number of falls in past 12 months 1      Abuse Screen:  Patient is not abused       Cognitive Screening    Has your family/caregiver stated any concerns about your memory: no         Health Maintenance Due     Health Maintenance Due   Topic Date Due    Shingrix Vaccine Age 49> (1 of 2) Never done    Flu Vaccine (1) 09/01/2021    Breast Cancer Screen Mammogram  12/30/2021       Patient Care Team   Patient Care Team:  Elo Castillo MD as PCP - General  Elo Castillo MD as PCP - [facility] Empaneled Provider    History     Patient Active Problem List   Diagnosis Code    History of Mastectomy Z90.10    History of tonsillectomy Z90.89    S/P LASIK Surgery Z98.890    Hx of breast reconstruction Z98.890    Depression F32. A    Vitamin D deficiency E55.9    Advance directive discussed with patient Z70.80    History of breast cancer Z85.3    Mild depression ([address] F32.0     Past Medical History:   Diagnosis Date    Cancer ([address]     Depression 4/1/2009    History of mastectomy 4/1/2009    History of tonsillectomy 4/1/2009    Hx of breast reconstruction 4/1/2009    S/P LASIK surgery 4/1/2009      Past Surgical History:   Procedure Laterality Date    HX BREAST RECONSTRUCTION      HX MASTECTOMY      HX REFRACTIVE SURGERY      HX TONSILLECTOMY      IN BREAST SURGERY PROCEDURE UNLISTED       Current Outpatient Medications   Medication Sig Dispense Refill    Desvenlafaxine 100 mg Tb24 Take 1 Tablet by mouth daily. TAKE ONE TABLET BY MOUTH DAILY 90 Tablet 3    ALPRAZolam (XANAX) 0.5 mg tablet Take 1 Tablet by mouth nightly as needed for Anxiety. Max Daily Amount: 0.5 mg. 30 Tablet 1    metFORMIN (GLUCOPHAGE) 1,000 mg tablet Take 1 Tablet by mouth two (2) times daily (with meals). 180 Tablet 0    ondansetron (ZOFRAN ODT) 4 mg disintegrating tablet Take 1 Tablet by mouth every eight (8) hours as needed for Nausea. 4 Tablet 1     No Known Allergies    Family History   Problem Relation Age of Onset    Cancer Mother         Breast    Heart Failure Mother     Heart Disease Mother     Cancer Father         Brain tumor    Cancer Maternal Grandmother     Psychiatric Disorder Daughter     Psychiatric Disorder Daughter      Social History     Tobacco Use    Smoking status: Never Smoker    Smokeless tobacco: Never Used   Substance Use Topics    Alcohol use:  Yes     Alcohol/week: 6.0 standard drinks     Types: 6 Standard drinks or equivalent per week         Pepe Iverson MD

## 2021-12-23 NOTE — PATIENT INSTRUCTIONS

## 2022-01-05 ENCOUNTER — PATIENT MESSAGE (OUTPATIENT)
Dept: INTERNAL MEDICINE CLINIC | Age: 67
End: 2022-01-05

## 2022-01-05 DIAGNOSIS — Z00.00 ROUTINE GENERAL MEDICAL EXAMINATION AT A HEALTH CARE FACILITY: ICD-10-CM

## 2022-01-05 RX ORDER — ALPRAZOLAM 0.5 MG/1
0.5 TABLET ORAL
Qty: 30 TABLET | Refills: 1 | Status: SHIPPED | OUTPATIENT
Start: 2022-01-05

## 2022-03-19 PROBLEM — F32.A MILD DEPRESSION: Status: ACTIVE | Noted: 2018-12-03

## 2022-03-19 PROBLEM — Z85.3 HISTORY OF BREAST CANCER: Status: ACTIVE | Noted: 2017-11-07

## 2022-03-31 RX ORDER — METFORMIN HYDROCHLORIDE 1000 MG/1
TABLET ORAL
Qty: 180 TABLET | Refills: 0 | Status: SHIPPED | OUTPATIENT
Start: 2022-03-31

## 2022-05-08 ENCOUNTER — TELEPHONE (OUTPATIENT)
Dept: INTERNAL MEDICINE CLINIC | Age: 67
End: 2022-05-08

## 2022-05-08 PROBLEM — I10 HTN (HYPERTENSION): Status: ACTIVE | Noted: 2022-05-08

## 2022-05-08 RX ORDER — AMLODIPINE BESYLATE 5 MG/1
5 TABLET ORAL DAILY
COMMUNITY
Start: 2022-05-08

## 2022-05-08 RX ORDER — NIRMATRELVIR AND RITONAVIR 300-100 MG
KIT ORAL
Qty: 1 BOX | Refills: 0 | Status: SHIPPED | OUTPATIENT
Start: 2022-05-08

## 2022-05-08 RX ORDER — LOSARTAN POTASSIUM 100 MG/1
100 TABLET ORAL DAILY
COMMUNITY
Start: 2022-05-08

## 2022-05-08 NOTE — TELEPHONE ENCOUNTER
ON CALL NOTE:   Pt reports she is covid positive. symptom onset yesterday and positive test 5/8/22. Sore throat and aching and hoarseness and thinks she has fever. She is requesting Paxlovid. No cough or SOB. No other symptoms. Reviewed this is EUA and indications. She is at high risk due to age/HTN/and BMI. She will send a copy of her positive covid test. Reviewed  medication and potential interactions with other medications and to hold xanax while on paxlovid. She is not currently taking xanax. Reviewed potential side effects of Paxlovid. She states her understanding. Medications & allergies reviewed. Corrected med list and paxlovid sent into pharmacy in [place] where she is out of town. . Red flags were reviewed with the patient to go to ED or urgent care for evaluation. Expected time course for resolution reviewed with patient.

## 2022-07-16 DIAGNOSIS — Z00.00 ROUTINE GENERAL MEDICAL EXAMINATION AT A HEALTH CARE FACILITY: ICD-10-CM

## 2022-07-16 RX ORDER — DESVENLAFAXINE 100 MG/1
1 TABLET, EXTENDED RELEASE ORAL DAILY
Qty: 15 TABLET | Refills: 0 | Status: SHIPPED | OUTPATIENT
Start: 2022-07-16

## 2022-12-14 ENCOUNTER — PATIENT MESSAGE (OUTPATIENT)
Dept: INTERNAL MEDICINE CLINIC | Age: 67
End: 2022-12-14

## 2022-12-14 DIAGNOSIS — Z00.00 ROUTINE GENERAL MEDICAL EXAMINATION AT A HEALTH CARE FACILITY: ICD-10-CM

## 2022-12-14 RX ORDER — DESVENLAFAXINE 100 MG/1
1 TABLET, EXTENDED RELEASE ORAL DAILY
Qty: 90 TABLET | Refills: 1 | Status: SHIPPED | OUTPATIENT
Start: 2022-12-14

## 2023-01-06 LAB — MAMMOGRAPHY, EXTERNAL: NORMAL

## 2023-01-09 DIAGNOSIS — Z00.00 ROUTINE GENERAL MEDICAL EXAMINATION AT A HEALTH CARE FACILITY: ICD-10-CM

## 2023-01-09 RX ORDER — DESVENLAFAXINE 100 MG/1
1 TABLET, EXTENDED RELEASE ORAL DAILY
Qty: 30 TABLET | Refills: 1 | Status: SHIPPED | OUTPATIENT
Start: 2023-01-09

## 2023-01-11 ENCOUNTER — OFFICE VISIT (OUTPATIENT)
Dept: INTERNAL MEDICINE CLINIC | Age: 68
End: 2023-01-11
Payer: MEDICARE

## 2023-01-11 VITALS
TEMPERATURE: 97.6 F | DIASTOLIC BLOOD PRESSURE: 85 MMHG | SYSTOLIC BLOOD PRESSURE: 122 MMHG | HEIGHT: 65 IN | RESPIRATION RATE: 12 BRPM | HEART RATE: 68 BPM | WEIGHT: 148 LBS | BODY MASS INDEX: 24.66 KG/M2 | OXYGEN SATURATION: 99 %

## 2023-01-11 DIAGNOSIS — R41.3 MEMORY DEFICIT: ICD-10-CM

## 2023-01-11 DIAGNOSIS — E55.9 VITAMIN D DEFICIENCY: ICD-10-CM

## 2023-01-11 DIAGNOSIS — F32.0 MAJOR DEPRESSIVE DISORDER, SINGLE EPISODE, MILD (HCC): ICD-10-CM

## 2023-01-11 DIAGNOSIS — E78.2 MIXED HYPERLIPIDEMIA: ICD-10-CM

## 2023-01-11 DIAGNOSIS — I10 PRIMARY HYPERTENSION: ICD-10-CM

## 2023-01-11 DIAGNOSIS — Z85.3 HISTORY OF BREAST CANCER: ICD-10-CM

## 2023-01-11 DIAGNOSIS — Z00.00 MEDICARE ANNUAL WELLNESS VISIT, SUBSEQUENT: Primary | ICD-10-CM

## 2023-01-11 PROCEDURE — G0463 HOSPITAL OUTPT CLINIC VISIT: HCPCS | Performed by: INTERNAL MEDICINE

## 2023-01-11 PROCEDURE — G8399 PT W/DXA RESULTS DOCUMENT: HCPCS | Performed by: INTERNAL MEDICINE

## 2023-01-11 PROCEDURE — G0439 PPPS, SUBSEQ VISIT: HCPCS | Performed by: INTERNAL MEDICINE

## 2023-01-11 PROCEDURE — 1090F PRES/ABSN URINE INCON ASSESS: CPT | Performed by: INTERNAL MEDICINE

## 2023-01-11 PROCEDURE — G9717 DOC PT DX DEP/BP F/U NT REQ: HCPCS | Performed by: INTERNAL MEDICINE

## 2023-01-11 PROCEDURE — 99214 OFFICE O/P EST MOD 30 MIN: CPT | Performed by: INTERNAL MEDICINE

## 2023-01-11 PROCEDURE — G8417 CALC BMI ABV UP PARAM F/U: HCPCS | Performed by: INTERNAL MEDICINE

## 2023-01-11 PROCEDURE — G8427 DOCREV CUR MEDS BY ELIG CLIN: HCPCS | Performed by: INTERNAL MEDICINE

## 2023-01-11 PROCEDURE — 3017F COLORECTAL CA SCREEN DOC REV: CPT | Performed by: INTERNAL MEDICINE

## 2023-01-11 PROCEDURE — 1101F PT FALLS ASSESS-DOCD LE1/YR: CPT | Performed by: INTERNAL MEDICINE

## 2023-01-11 PROCEDURE — G9899 SCRN MAM PERF RSLTS DOC: HCPCS | Performed by: INTERNAL MEDICINE

## 2023-01-11 PROCEDURE — G8536 NO DOC ELDER MAL SCRN: HCPCS | Performed by: INTERNAL MEDICINE

## 2023-01-11 RX ORDER — ZOSTER VACCINE RECOMBINANT, ADJUVANTED 50 MCG/0.5
0.5 KIT INTRAMUSCULAR ONCE
Qty: 0.5 ML | Refills: 1 | Status: SHIPPED | OUTPATIENT
Start: 2023-01-11 | End: 2023-01-11

## 2023-01-11 RX ORDER — LOSARTAN POTASSIUM AND HYDROCHLOROTHIAZIDE 12.5; 5 MG/1; MG/1
1 TABLET ORAL DAILY
COMMUNITY

## 2023-01-11 NOTE — PATIENT INSTRUCTIONS
Medicare Wellness Visit, Female     The best way to live healthy is to have a lifestyle where you eat a well-balanced diet, exercise regularly, limit alcohol use, and quit all forms of tobacco/nicotine, if applicable. Regular preventive services are another way to keep healthy. Preventive services (vaccines, screening tests, monitoring & exams) can help personalize your care plan, which helps you manage your own care. Screening tests can find health problems at the earliest stages, when they are easiest to treat. [facility] follows the current, evidence-based guidelines published by the [facility] Jeevan Devries ([facility]) when recommending preventive services for our patients. Because we follow these guidelines, sometimes recommendations change over time as research supports it. (For example, mammograms used to be recommended annually. Even though Medicare will still pay for an annual mammogram, the newer guidelines recommend a mammogram every two years for women of average risk). Of course, you and your doctor may decide to screen more often for some diseases, based on your risk and your co-morbidities (chronic disease you are already diagnosed with). Preventive services for you include:  - Medicare offers their members a free annual wellness visit, which is time for you and your primary care provider to discuss and plan for your preventive service needs.  Take advantage of this benefit every year!    -Over the age of 72 should receive the recommended pneumonia vaccines.    -All adults should have a flu vaccine yearly.  -All adults should have a tetanus vaccine every 10 years.   -Over the age 48 should receive the shingles vaccines.        -All adults should be screened once for Hepatitis C.  -All adults age 38-68 who are overweight should have a diabetes screening test once every three years.   -Other screening tests and preventive services for persons with diabetes include: an eye exam to screen for diabetic retinopathy, a kidney function test, a foot exam, and stricter control over your cholesterol.   -Cardiovascular screening for adults with routine risk involves an electrocardiogram (ECG) at intervals determined by your doctor.     -Colorectal cancer screenings should be done for adults age 39-70 with no increased risk factors for colorectal cancer. There are a number of acceptable methods of screening for this type of cancer. Each test has its own benefits and drawbacks. Discuss with your doctor what is most appropriate for you during your annual wellness visit. The different tests include: colonoscopy (considered the best screening method), a fecal occult blood test, a fecal DNA test, and sigmoidoscopy.    -Lung cancer screening is recommended annually with a low dose CT scan for adults between age 54 and 68, who have smoked at least 30 pack years (equivalent of 1 pack per day for 30 days), and who is a current smoker or quit less than 15 years ago.    -A bone mass density test is recommended when a woman turns 65 to screen for osteoporosis. This test is only recommended one time, as a screening. Some providers will use this same test as a disease monitoring tool if you already have osteoporosis. -Breast cancer screenings are recommended every other year for women of normal risk, age 54-69.    -Cervical cancer screenings for women over age 72 are only recommended with certain risk factors.      Here is a list of your current Health Maintenance items (your personalized list of preventive services) with a due date:  Health Maintenance Due   Topic Date Due    Shingles Vaccine (1 of 2) Never done    COVID-19 Vaccine (4 - Booster for [facility] series) 01/04/2022    Depression Monitoring  12/22/2022    Mammogram  01/03/2023

## 2023-01-11 NOTE — PROGRESS NOTES
This is the Subsequent Medicare Annual Wellness Exam, performed 12 months or more after the Initial AWV or the last Subsequent AWV    I have reviewed the patient's medical history in detail and updated the computerized patient record. Also for a follow up of her health issues. Depression is well controlled. Weight is down. She is having issues with withdrawal when she misses doses. Had recent repair of her breast implants. BP well controlled. ROS - Per HPI    Physical Examination: General appearance - alert, well appearing, and in no distress  Mental status - alert, oriented to person, place, and time  Ears - bilateral TM's and external ear canals normal  Mouth - mucous membranes moist, pharynx normal without lesions  Neck - supple, no significant adenopathy  Lymphatics - no palpable lymphadenopathy, no hepatosplenomegaly  Chest - clear to auscultation, no wheezes, rales or rhonchi, symmetric air entry  Heart - normal rate, regular rhythm, normal S1, S2, no murmurs, rubs, clicks or gallops  Abdomen - soft, nontender, nondistended, no masses or organomegaly  Back exam - full range of motion, no tenderness, palpable spasm or pain on motion  Neurological - alert, oriented, normal speech, no focal findings or movement disorder noted  Musculoskeletal - no joint tenderness, deformity or swelling  Extremities - peripheral pulses normal, no pedal edema, no clubbing or cyanosis    Assessment/Plan   Education and counseling provided:  Are appropriate based on today's review and evaluation  End-of-Life planning (with patient's consent)  Diabetes screening test    1. Medicare annual wellness visit, subsequent  2. Primary hypertension - BP well controlled on current meds  -     TSH 3RD GENERATION; Future  -     CBC WITH AUTOMATED DIFF; Future  -     METABOLIC PANEL, COMPREHENSIVE; Future  3. Major depressive disorder, single episode, mild ([address] - controlled. Will continue same meds.  Discussed change to longer acting drugs but deferred that change.  -     REFERRAL TO NEUROPSYCHOLOGY  4. History of breast cancer - MRI in 6 months. -     CEA; Future  5. Vitamin D deficiency - repleted  6. Memory deficit - some short term memory loss. Check labs and memory testing.   -     REFERRAL TO NEUROPSYCHOLOGY  -     VITAMIN B12; Future  7. Mixed hyperlipidemia - diet controlled. Negative cardiac workup. Will check labs and do a risk calculator.   -     LIPID PANEL; Future     Depression Risk Factor Screening     3 most recent PHQ Screens 1/11/2023   Little interest or pleasure in doing things Not at all   Feeling down, depressed, irritable, or hopeless Not at all   Total Score PHQ 2 0       Alcohol & Drug Abuse Risk Screen    Do you average more than 1 drink per night or more than 7 drinks a week:  No    On any one occasion in the past three months have you have had more than 3 drinks containing alcohol:  No          Functional Ability and Level of Safety    Hearing: Hearing is good. Activities of Daily Living: The home contains: no safety equipment. Patient does total self care      Ambulation: with no difficulty     Fall Risk:  Fall Risk Assessment, last 12 mths 1/11/2023   Able to walk? Yes   Fall in past 12 months? 0   Do you feel unsteady?  0   Are you worried about falling 0   Number of falls in past 12 months -      Abuse Screen:  Patient is not abused       Cognitive Screening    Has your family/caregiver stated any concerns about your memory: no         Health Maintenance Due     Health Maintenance Due   Topic Date Due    Shingles Vaccine (1 of 2) Never done    COVID-19 Vaccine (4 - Booster for [employer] series) 01/04/2022    Depression Monitoring  12/22/2022    Breast Cancer Screen Mammogram  01/03/2023       Patient Care Team   Patient Care Team:  Selestino Nissen, MD as PCP - General  Selestino Nissen, MD as PCP - [facility] Empaneled Provider    History     Patient Active Problem List   Diagnosis Code    History of Mastectomy Z90.10    History of tonsillectomy Z90.89    S/P LASIK Surgery Z98.890    Hx of breast reconstruction Z98.890    Depression F32. A    Vitamin D deficiency E55.9    Advance directive discussed with patient Z71.89    History of breast cancer Z85.3    Mild depression F32. A    HTN (hypertension) I10     Past Medical History:   Diagnosis Date    Cancer ([address]     Depression 4/1/2009    History of mastectomy 4/1/2009    History of tonsillectomy 4/1/2009    Hx of breast reconstruction 4/1/2009    S/P LASIK surgery 4/1/2009      Past Surgical History:   Procedure Laterality Date    HX BREAST RECONSTRUCTION      HX MASTECTOMY Left     HX REFRACTIVE SURGERY      HX TONSILLECTOMY      DE UNLISTED PROCEDURE BREAST       Current Outpatient Medications   Medication Sig Dispense Refill    losartan-hydroCHLOROthiazide (HYZAAR) 50-12.5 mg per tablet Take 1 Tablet by mouth daily. Desvenlafaxine 100 mg Tb24 Take 1 Tablet by mouth daily. TAKE ONE TABLET BY MOUTH DAILY 30 Tablet 1    amLODIPine (NORVASC) 5 mg tablet Take 1 Tablet by mouth daily. ALPRAZolam (XANAX) 0.5 mg tablet Take 1 Tablet by mouth nightly as needed for Anxiety. Max Daily Amount: 0.5 mg. 30 Tablet 1    ondansetron (ZOFRAN ODT) 4 mg disintegrating tablet Take 1 Tablet by mouth every eight (8) hours as needed for Nausea.  4 Tablet 1    metFORMIN (GLUCOPHAGE) 1,000 mg tablet TAKE ONE TABLET BY MOUTH TWICE A DAY WITH MEALS (Patient not taking: Reported on 1/11/2023) 180 Tablet 0     No Known Allergies    Family History   Problem Relation Age of Onset    Cancer Mother         Breast    Heart Failure Mother     Heart Disease Mother     Cancer Father         Brain tumor    Cancer Maternal Grandmother     Psychiatric Disorder Daughter     Psychiatric Disorder Daughter      Social History     Tobacco Use    Smoking status: Never    Smokeless tobacco: Never   Substance Use Topics    Alcohol use: Not Currently         Daisy Villalta MD

## 2023-03-17 RX ORDER — ONDANSETRON 4 MG/1
4 TABLET, ORALLY DISINTEGRATING ORAL
Qty: 4 TABLET | Refills: 1 | Status: SHIPPED | OUTPATIENT
Start: 2023-03-17

## 2023-04-27 DIAGNOSIS — Z00.00 ROUTINE GENERAL MEDICAL EXAMINATION AT A HEALTH CARE FACILITY: ICD-10-CM

## 2023-04-27 RX ORDER — DESVENLAFAXINE 100 MG/1
TABLET, EXTENDED RELEASE ORAL
Qty: 30 TABLET | Refills: 1 | Status: SHIPPED | OUTPATIENT
Start: 2023-04-27

## 2023-05-22 ENCOUNTER — TELEPHONE (OUTPATIENT)
Age: 68
End: 2023-05-22

## 2023-05-22 NOTE — TELEPHONE ENCOUNTER
----- Message from Lluvia Serrano sent at 5/22/2023 11:26 AM EDT -----  Subject: Message to Provider    QUESTIONS  Information for Provider? Caesar Mathews from [facility] called for   patients most recent labs to be faxed to [phone]  ---------------------------------------------------------------------------  --------------  [address]  [phone]; Do not leave any message, patient will call back for answer  ---------------------------------------------------------------------------  --------------  SCRIPT ANSWERS  Relationship to Patient? Covered Entity  Covered Entity Type? Physician Office? Representative Name?  Caesar Mathews

## 2023-05-24 RX ORDER — DESVENLAFAXINE 100 MG/1
100 TABLET, EXTENDED RELEASE ORAL DAILY
Qty: 90 TABLET | Refills: 1 | Status: SHIPPED | OUTPATIENT
Start: 2023-05-24

## 2023-05-24 RX ORDER — NITROFURANTOIN 25; 75 MG/1; MG/1
100 CAPSULE ORAL 2 TIMES DAILY
Qty: 6 CAPSULE | Refills: 0 | Status: SHIPPED | OUTPATIENT
Start: 2023-05-24 | End: 2023-06-03

## 2023-05-25 ENCOUNTER — TELEPHONE (OUTPATIENT)
Age: 68
End: 2023-05-25

## 2023-05-25 NOTE — TELEPHONE ENCOUNTER
Received incoming call from Gregory Kerr w/ [address] - needing clarification on patients Macrobid that was sent by [facility] yesterday. Reviewed w/ J and advised Young sig should be 1 capsule PO BID x 3 days qty#6. Gregory Kerr voiced understanding and will process refill.

## 2023-07-13 ENCOUNTER — E-VISIT (OUTPATIENT)
Age: 68
End: 2023-07-13

## 2023-07-13 DIAGNOSIS — L24.7 IRRITANT CONTACT DERMATITIS DUE TO PLANTS, EXCEPT FOOD: Primary | ICD-10-CM

## 2023-07-13 PROCEDURE — 99421 OL DIG E/M SVC 5-10 MIN: CPT | Performed by: INTERNAL MEDICINE

## 2023-07-13 RX ORDER — METHYLPREDNISOLONE 4 MG/1
TABLET ORAL
Qty: 1 KIT | Refills: 0 | Status: SHIPPED | OUTPATIENT
Start: 2023-07-13 | End: 2023-07-19

## 2023-07-13 NOTE — PROGRESS NOTES
E-Visit Note:    HPI: per patient questionnaire, this has been reviewed  EXAM: n/a    ----------------------------------  1. Irritant contact dermatitis due to plants, except food       ----------------------------------  The patient was advised to call if these symptoms worsen or fail to improve as anticipated. 5-10 minutes were spent on the digital evaluation and management of this patient.      Kristin Brown MD

## 2023-11-15 ENCOUNTER — OFFICE VISIT (OUTPATIENT)
Age: 68
End: 2023-11-15
Payer: MEDICARE

## 2023-11-15 VITALS
BODY MASS INDEX: 26.66 KG/M2 | OXYGEN SATURATION: 95 % | RESPIRATION RATE: 14 BRPM | TEMPERATURE: 97.6 F | WEIGHT: 160 LBS | SYSTOLIC BLOOD PRESSURE: 97 MMHG | DIASTOLIC BLOOD PRESSURE: 67 MMHG | HEIGHT: 65 IN | HEART RATE: 67 BPM

## 2023-11-15 DIAGNOSIS — F32.5 MAJOR DEPRESSIVE DISORDER WITH SINGLE EPISODE, IN FULL REMISSION (HCC): ICD-10-CM

## 2023-11-15 DIAGNOSIS — I10 ESSENTIAL HYPERTENSION: ICD-10-CM

## 2023-11-15 DIAGNOSIS — Z01.818 PREOP EXAM FOR INTERNAL MEDICINE: ICD-10-CM

## 2023-11-15 DIAGNOSIS — H02.409 PTOSIS DUE TO AGING: Primary | ICD-10-CM

## 2023-11-15 PROCEDURE — 3078F DIAST BP <80 MM HG: CPT | Performed by: INTERNAL MEDICINE

## 2023-11-15 PROCEDURE — 1123F ACP DISCUSS/DSCN MKR DOCD: CPT | Performed by: INTERNAL MEDICINE

## 2023-11-15 PROCEDURE — G8484 FLU IMMUNIZE NO ADMIN: HCPCS | Performed by: INTERNAL MEDICINE

## 2023-11-15 PROCEDURE — 99214 OFFICE O/P EST MOD 30 MIN: CPT | Performed by: INTERNAL MEDICINE

## 2023-11-15 PROCEDURE — 3017F COLORECTAL CA SCREEN DOC REV: CPT | Performed by: INTERNAL MEDICINE

## 2023-11-15 PROCEDURE — 1036F TOBACCO NON-USER: CPT | Performed by: INTERNAL MEDICINE

## 2023-11-15 PROCEDURE — G8427 DOCREV CUR MEDS BY ELIG CLIN: HCPCS | Performed by: INTERNAL MEDICINE

## 2023-11-15 PROCEDURE — 3074F SYST BP LT 130 MM HG: CPT | Performed by: INTERNAL MEDICINE

## 2023-11-15 PROCEDURE — G8419 CALC BMI OUT NRM PARAM NOF/U: HCPCS | Performed by: INTERNAL MEDICINE

## 2023-11-15 PROCEDURE — 1090F PRES/ABSN URINE INCON ASSESS: CPT | Performed by: INTERNAL MEDICINE

## 2023-11-15 PROCEDURE — G8399 PT W/DXA RESULTS DOCUMENT: HCPCS | Performed by: INTERNAL MEDICINE

## 2023-11-15 RX ORDER — ALPRAZOLAM 0.5 MG/1
0.5 TABLET ORAL 3 TIMES DAILY PRN
Qty: 30 TABLET | Refills: 0 | Status: SHIPPED | OUTPATIENT
Start: 2023-11-15 | End: 2023-12-15

## 2023-11-15 SDOH — ECONOMIC STABILITY: FOOD INSECURITY: WITHIN THE PAST 12 MONTHS, THE FOOD YOU BOUGHT JUST DIDN'T LAST AND YOU DIDN'T HAVE MONEY TO GET MORE.: NEVER TRUE

## 2023-11-15 SDOH — ECONOMIC STABILITY: INCOME INSECURITY: HOW HARD IS IT FOR YOU TO PAY FOR THE VERY BASICS LIKE FOOD, HOUSING, MEDICAL CARE, AND HEATING?: NOT HARD AT ALL

## 2023-11-15 SDOH — ECONOMIC STABILITY: FOOD INSECURITY: WITHIN THE PAST 12 MONTHS, YOU WORRIED THAT YOUR FOOD WOULD RUN OUT BEFORE YOU GOT MONEY TO BUY MORE.: NEVER TRUE

## 2023-11-15 SDOH — ECONOMIC STABILITY: HOUSING INSECURITY
IN THE LAST 12 MONTHS, WAS THERE A TIME WHEN YOU DID NOT HAVE A STEADY PLACE TO SLEEP OR SLEPT IN A SHELTER (INCLUDING NOW)?: NO

## 2023-11-15 NOTE — PROGRESS NOTES
and in no distress  Mental status - alert, oriented to person, place, and time  Ears - bilateral TM's and external ear canals normal  Mouth - mucous membranes moist, pharynx normal without lesions  Neck - supple, no significant adenopathy  Lymphatics - no palpable lymphadenopathy, no hepatosplenomegaly  Chest - clear to auscultation, no wheezes, rales or rhonchi, symmetric air entry  Heart - normal rate, regular rhythm, normal S1, S2, no murmurs, rubs, clicks or gallops  Abdomen - soft, nontender, nondistended, no masses or organomegaly  Neurological - alert, oriented, normal speech, no focal findings or movement disorder noted  Musculoskeletal - no joint tenderness, deformity or swelling  Extremities - peripheral pulses normal, no pedal edema, no clubbing or cyanosis       DIAGNOSTICS:     1. Labs:    Lab Results   Component Value Date    WBC 5.2 01/11/2023    HGB 13.1 01/11/2023    HCT 41.2 01/11/2023    MCV 91.6 01/11/2023     01/11/2023      Lab Results   Component Value Date     01/11/2023    K 4.2 01/11/2023     (H) 01/11/2023    CO2 30 01/11/2023    BUN 17 01/11/2023    CREATININE 0.64 01/11/2023    GLUCOSE 82 01/11/2023    CALCIUM 8.9 01/11/2023    PROT 6.3 (L) 01/11/2023    LABALBU 3.6 01/11/2023    BILITOT 0.4 01/11/2023    ALKPHOS 78 01/11/2023    AST 21 01/11/2023    ALT 20 01/11/2023    GFRAA >60 12/30/2021    AGRATIO 1.3 01/11/2023    GLOB 2.7 01/11/2023          IMPRESSION:   None  No contraindications to planned surgery  Karissa Hernandez was seen today for pre-op exam.    Diagnoses and all orders for this visit:    Ptosis due to aging-cleared for surgery. Major depressive disorder with single episode, in full remission (HCC)-stable on current meds. -     ALPRAZolam (XANAX) 0.5 MG tablet; Take 1 tablet by mouth 3 times daily as needed for Sleep for up to 30 days. Max Daily Amount: 1.5 mg    Essential hypertension-blood pressure well controlled on current meds.   She will have a follow-up

## 2023-11-22 ENCOUNTER — COMMUNITY OUTREACH (OUTPATIENT)
Age: 68
End: 2023-11-22

## 2023-12-04 RX ORDER — DESVENLAFAXINE 100 MG/1
100 TABLET, EXTENDED RELEASE ORAL DAILY
Qty: 90 TABLET | Refills: 0 | Status: SHIPPED | OUTPATIENT
Start: 2023-12-04

## 2023-12-05 NOTE — TELEPHONE ENCOUNTER
Ilsa. Chart reviewed, 90 tabs provided in June '23. Asking for refills now. She has f/u with PCP next month. Medication refilled.

## 2024-01-18 ENCOUNTER — OFFICE VISIT (OUTPATIENT)
Age: 69
End: 2024-01-18
Payer: MEDICARE

## 2024-01-18 VITALS
HEIGHT: 65 IN | RESPIRATION RATE: 15 BRPM | SYSTOLIC BLOOD PRESSURE: 111 MMHG | HEART RATE: 77 BPM | WEIGHT: 160.2 LBS | BODY MASS INDEX: 26.69 KG/M2 | DIASTOLIC BLOOD PRESSURE: 81 MMHG | OXYGEN SATURATION: 96 % | TEMPERATURE: 97.3 F

## 2024-01-18 DIAGNOSIS — U07.1 COVID-19: ICD-10-CM

## 2024-01-18 DIAGNOSIS — F41.8 SITUATIONAL ANXIETY: ICD-10-CM

## 2024-01-18 DIAGNOSIS — E55.9 VITAMIN D DEFICIENCY, UNSPECIFIED: ICD-10-CM

## 2024-01-18 DIAGNOSIS — E78.2 MIXED HYPERLIPIDEMIA: ICD-10-CM

## 2024-01-18 DIAGNOSIS — Z00.00 MEDICARE ANNUAL WELLNESS VISIT, SUBSEQUENT: Primary | ICD-10-CM

## 2024-01-18 DIAGNOSIS — Z85.3 PERSONAL HISTORY OF MALIGNANT NEOPLASM OF BREAST: ICD-10-CM

## 2024-01-18 DIAGNOSIS — J34.89 STUFFY AND RUNNY NOSE: ICD-10-CM

## 2024-01-18 DIAGNOSIS — I10 ESSENTIAL HYPERTENSION: ICD-10-CM

## 2024-01-18 DIAGNOSIS — R50.9 FEVER, UNSPECIFIED FEVER CAUSE: ICD-10-CM

## 2024-01-18 DIAGNOSIS — F32.5 MAJOR DEPRESSIVE DISORDER WITH SINGLE EPISODE, IN FULL REMISSION (HCC): ICD-10-CM

## 2024-01-18 LAB
Lab: ABNORMAL
QC PASS/FAIL: ABNORMAL
SARS-COV-2, POC: DETECTED

## 2024-01-18 PROCEDURE — 99214 OFFICE O/P EST MOD 30 MIN: CPT | Performed by: INTERNAL MEDICINE

## 2024-01-18 PROCEDURE — 87635 SARS-COV-2 COVID-19 AMP PRB: CPT | Performed by: INTERNAL MEDICINE

## 2024-01-18 RX ORDER — ALPRAZOLAM 0.5 MG/1
0.5 TABLET ORAL NIGHTLY PRN
Qty: 30 TABLET | Refills: 0 | Status: SHIPPED | OUTPATIENT
Start: 2024-01-18 | End: 2024-02-17

## 2024-01-18 RX ORDER — ALPRAZOLAM 0.5 MG/1
0.5 TABLET ORAL NIGHTLY PRN
COMMUNITY
End: 2024-01-18 | Stop reason: SDUPTHER

## 2024-01-18 ASSESSMENT — PATIENT HEALTH QUESTIONNAIRE - PHQ9
SUM OF ALL RESPONSES TO PHQ QUESTIONS 1-9: 0
SUM OF ALL RESPONSES TO PHQ QUESTIONS 1-9: 0
1. LITTLE INTEREST OR PLEASURE IN DOING THINGS: 0
2. FEELING DOWN, DEPRESSED OR HOPELESS: 0
SUM OF ALL RESPONSES TO PHQ QUESTIONS 1-9: 0
SUM OF ALL RESPONSES TO PHQ QUESTIONS 1-9: 0
SUM OF ALL RESPONSES TO PHQ9 QUESTIONS 1 & 2: 0

## 2024-01-18 ASSESSMENT — LIFESTYLE VARIABLES
HOW MANY STANDARD DRINKS CONTAINING ALCOHOL DO YOU HAVE ON A TYPICAL DAY: 1 OR 2
HOW OFTEN DO YOU HAVE A DRINK CONTAINING ALCOHOL: MONTHLY OR LESS

## 2024-01-18 NOTE — PROGRESS NOTES
Medications    Medication Sig Taking? Authorizing Provider   ALPRAZolam (XANAX) 0.5 MG tablet Take 1 tablet by mouth nightly as needed for Sleep for up to 30 days. Max Daily Amount: 0.5 mg Yes Dashawn Gonzalez MD   desvenlafaxine succinate (PRISTIQ) 100 MG TB24 extended release tablet TAKE 1 TABLET BY MOUTH DAILY Yes Karl Paz MD   amLODIPine (NORVASC) 5 MG tablet Take by mouth daily Yes Automatic Reconciliation, Ar   losartan-hydroCHLOROthiazide (HYZAAR) 50-12.5 MG per tablet Take 1 tablet by mouth daily Yes Automatic Reconciliation, Ar   ondansetron (ZOFRAN-ODT) 4 MG disintegrating tablet Take by mouth every 8 hours as needed  Patient not taking: Reported on 1/18/2024  Automatic Reconciliation, Ar       CareTeam (Including outside providers/suppliers regularly involved in providing care):   Patient Care Team:  Dashawn Gonzalez MD as PCP - General  Dashawn Gonzalez MD as PCP - Empaneled Provider     Reviewed and updated this visit:  Tobacco  Allergies  Meds  Problems  Med Hx  Surg Hx  Soc Hx  Fam Hx

## 2024-01-18 NOTE — PATIENT INSTRUCTIONS
dental visit is recommended every 6 months.  Try to get at least 150 minutes of exercise per week or 10,000 steps per day on a pedometer .  Order or download the FREE \"Exercise & Physical Activity: Your Everyday Guide\" from [employer]. Call 1-208.684.3052 or search [employer] online.  You need 2451-1338 mg of calcium and 8110-3812 IU of vitamin D per day. It is possible to meet your calcium requirement with diet alone, but a vitamin D supplement is usually necessary to meet this goal.  When exposed to the sun, use a sunscreen that protects against both UVA and UVB radiation with an SPF of 30 or greater. Reapply every 2 to 3 hours or after sweating, drying off with a towel, or swimming.  Always wear a seat belt when traveling in a car. Always wear a helmet when riding a bicycle or motorcycle.

## 2024-01-25 DIAGNOSIS — Z85.3 PERSONAL HISTORY OF MALIGNANT NEOPLASM OF BREAST: ICD-10-CM

## 2024-01-25 DIAGNOSIS — I10 ESSENTIAL HYPERTENSION: ICD-10-CM

## 2024-01-25 DIAGNOSIS — E78.2 MIXED HYPERLIPIDEMIA: ICD-10-CM

## 2024-01-26 LAB
ALBUMIN SERPL-MCNC: 3.6 G/DL (ref 3.5–5)
ALBUMIN/GLOB SERPL: 1.2 (ref 1.1–2.2)
ALP SERPL-CCNC: 83 U/L (ref 45–117)
ANION GAP SERPL CALC-SCNC: 5 MMOL/L (ref 5–15)
AST SERPL-CCNC: 13 U/L (ref 15–37)
BASOPHILS # BLD: 0 K/UL (ref 0–0.1)
BASOPHILS NFR BLD: 0 % (ref 0–1)
BILIRUB SERPL-MCNC: 0.4 MG/DL (ref 0.2–1)
BUN SERPL-MCNC: 23 MG/DL (ref 6–20)
BUN/CREAT SERPL: 38 (ref 12–20)
CALCIUM SERPL-MCNC: 9.2 MG/DL (ref 8.5–10.1)
CEA SERPL-MCNC: 4.7 NG/ML
CHLORIDE SERPL-SCNC: 109 MMOL/L (ref 97–108)
CHOLEST SERPL-MCNC: 257 MG/DL
CO2 SERPL-SCNC: 27 MMOL/L (ref 21–32)
CREAT SERPL-MCNC: 0.61 MG/DL (ref 0.55–1.02)
DIFFERENTIAL METHOD BLD: NORMAL
EOSINOPHIL # BLD: 0.2 K/UL (ref 0–0.4)
EOSINOPHIL NFR BLD: 3 % (ref 0–7)
ERYTHROCYTE [DISTWIDTH] IN BLOOD BY AUTOMATED COUNT: 12.2 % (ref 11.5–14.5)
GLOBULIN SER CALC-MCNC: 3 G/DL (ref 2–4)
GLUCOSE SERPL-MCNC: 92 MG/DL (ref 65–100)
HCT VFR BLD AUTO: 39.5 % (ref 35–47)
HDLC SERPL-MCNC: 78 MG/DL
HDLC SERPL: 3.3 (ref 0–5)
HGB BLD-MCNC: 13.6 G/DL (ref 11.5–16)
IMM GRANULOCYTES # BLD AUTO: 0 K/UL (ref 0–0.04)
IMM GRANULOCYTES NFR BLD AUTO: 0 % (ref 0–0.5)
LDLC SERPL CALC-MCNC: 169.4 MG/DL (ref 0–100)
LYMPHOCYTES # BLD: 2.5 K/UL (ref 0.8–3.5)
LYMPHOCYTES NFR BLD: 37 % (ref 12–49)
MCH RBC QN AUTO: 30.2 PG (ref 26–34)
MCHC RBC AUTO-ENTMCNC: 34.4 G/DL (ref 30–36.5)
MCV RBC AUTO: 87.6 FL (ref 80–99)
MONOCYTES # BLD: 0.5 K/UL (ref 0–1)
MONOCYTES NFR BLD: 8 % (ref 5–13)
NEUTS SEG # BLD: 3.6 K/UL (ref 1.8–8)
NEUTS SEG NFR BLD: 52 % (ref 32–75)
NRBC # BLD: 0 K/UL (ref 0–0.01)
NRBC BLD-RTO: 0 PER 100 WBC
PLATELET # BLD AUTO: 359 K/UL (ref 150–400)
PMV BLD AUTO: 9.2 FL (ref 8.9–12.9)
POTASSIUM SERPL-SCNC: 4.6 MMOL/L (ref 3.5–5.1)
PROT SERPL-MCNC: 6.6 G/DL (ref 6.4–8.2)
RBC # BLD AUTO: 4.51 M/UL (ref 3.8–5.2)
SODIUM SERPL-SCNC: 141 MMOL/L (ref 136–145)
TRIGL SERPL-MCNC: 48 MG/DL
TSH SERPL DL<=0.05 MIU/L-ACNC: 1.58 UIU/ML (ref 0.36–3.74)
VLDLC SERPL CALC-MCNC: 9.6 MG/DL
WBC # BLD AUTO: 6.9 K/UL (ref 3.6–11)

## 2024-03-29 RX ORDER — DESVENLAFAXINE 100 MG/1
100 TABLET, EXTENDED RELEASE ORAL DAILY
Qty: 30 TABLET | Refills: 0 | Status: SHIPPED | OUTPATIENT
Start: 2024-03-29

## 2024-03-29 NOTE — TELEPHONE ENCOUNTER
Pt last seen on 1/18/24. No fuv specified.    Rx last filled by provider on call on 12/4/23 #90/0RF.    Will forward to MD on call Dr Orellana for refill.

## 2024-04-18 NOTE — TELEPHONE ENCOUNTER
Medication Refill Request    Honey Patrick is requesting a refill of the following medication(s):   desvenlafaxine succinate (PRISTIQ) 100 MG TB24 extended release tablet               Please send refill to:     [facility] [id number] - [place], [place] - [address] [phone] F [phone] [address] [address]  Phone: [phone] Fax: [phone]

## 2024-04-19 RX ORDER — DESVENLAFAXINE 100 MG/1
100 TABLET, EXTENDED RELEASE ORAL DAILY
Qty: 30 TABLET | Refills: 0 | Status: SHIPPED | OUTPATIENT
Start: 2024-04-19

## 2024-04-30 ENCOUNTER — ANESTHESIA EVENT (OUTPATIENT)
Facility: HOSPITAL | Age: 69
End: 2024-04-30
Payer: MEDICARE

## 2024-04-30 ENCOUNTER — HOSPITAL ENCOUNTER (OUTPATIENT)
Facility: HOSPITAL | Age: 69
Setting detail: OUTPATIENT SURGERY
Discharge: HOME | End: 2024-04-30
Attending: INTERNAL MEDICINE | Admitting: INTERNAL MEDICINE
Payer: MEDICARE

## 2024-04-30 ENCOUNTER — ANESTHESIA (OUTPATIENT)
Facility: HOSPITAL | Age: 69
End: 2024-04-30
Payer: MEDICARE

## 2024-04-30 VITALS
DIASTOLIC BLOOD PRESSURE: 57 MMHG | WEIGHT: 162 LBS | RESPIRATION RATE: 13 BRPM | BODY MASS INDEX: 26.99 KG/M2 | HEART RATE: 65 BPM | OXYGEN SATURATION: 95 % | HEIGHT: 65 IN | TEMPERATURE: 97.2 F | SYSTOLIC BLOOD PRESSURE: 101 MMHG

## 2024-04-30 PROCEDURE — 7100000010 HC PHASE II RECOVERY - FIRST 15 MIN: Performed by: INTERNAL MEDICINE

## 2024-04-30 PROCEDURE — 3700000000 HC ANESTHESIA ATTENDED CARE: Performed by: INTERNAL MEDICINE

## 2024-04-30 PROCEDURE — 2580000003 HC RX 258: Performed by: INTERNAL MEDICINE

## 2024-04-30 PROCEDURE — 3600007511: Performed by: INTERNAL MEDICINE

## 2024-04-30 PROCEDURE — 2500000003 HC RX 250 WO HCPCS: Performed by: NURSE ANESTHETIST, CERTIFIED REGISTERED

## 2024-04-30 PROCEDURE — 7100000011 HC PHASE II RECOVERY - ADDTL 15 MIN: Performed by: INTERNAL MEDICINE

## 2024-04-30 PROCEDURE — 6360000002 HC RX W HCPCS: Performed by: NURSE ANESTHETIST, CERTIFIED REGISTERED

## 2024-04-30 PROCEDURE — 3700000001 HC ADD 15 MINUTES (ANESTHESIA): Performed by: INTERNAL MEDICINE

## 2024-04-30 PROCEDURE — 3600007501: Performed by: INTERNAL MEDICINE

## 2024-04-30 PROCEDURE — 88305 TISSUE EXAM BY PATHOLOGIST: CPT

## 2024-04-30 PROCEDURE — 2709999900 HC NON-CHARGEABLE SUPPLY: Performed by: INTERNAL MEDICINE

## 2024-04-30 RX ORDER — SODIUM CHLORIDE 9 MG/ML
25 INJECTION, SOLUTION INTRAVENOUS PRN
Status: DISCONTINUED | OUTPATIENT
Start: 2024-04-30 | End: 2024-04-30 | Stop reason: HOSPADM

## 2024-04-30 RX ORDER — SODIUM CHLORIDE 0.9 % (FLUSH) 0.9 %
5-40 SYRINGE (ML) INJECTION EVERY 12 HOURS SCHEDULED
Status: DISCONTINUED | OUTPATIENT
Start: 2024-04-30 | End: 2024-04-30 | Stop reason: HOSPADM

## 2024-04-30 RX ORDER — SODIUM CHLORIDE 9 MG/ML
INJECTION, SOLUTION INTRAVENOUS CONTINUOUS
Status: DISCONTINUED | OUTPATIENT
Start: 2024-04-30 | End: 2024-04-30 | Stop reason: HOSPADM

## 2024-04-30 RX ORDER — SODIUM CHLORIDE 0.9 % (FLUSH) 0.9 %
5-40 SYRINGE (ML) INJECTION PRN
Status: DISCONTINUED | OUTPATIENT
Start: 2024-04-30 | End: 2024-04-30 | Stop reason: HOSPADM

## 2024-04-30 RX ADMIN — PROPOFOL 40 MG: 10 INJECTION, EMULSION INTRAVENOUS at 08:46

## 2024-04-30 RX ADMIN — PROPOFOL 40 MG: 10 INJECTION, EMULSION INTRAVENOUS at 08:56

## 2024-04-30 RX ADMIN — PROPOFOL 80 MG: 10 INJECTION, EMULSION INTRAVENOUS at 08:44

## 2024-04-30 RX ADMIN — SODIUM CHLORIDE: 9 INJECTION, SOLUTION INTRAVENOUS at 08:34

## 2024-04-30 RX ADMIN — PROPOFOL 40 MG: 10 INJECTION, EMULSION INTRAVENOUS at 08:50

## 2024-04-30 RX ADMIN — PROPOFOL 40 MG: 10 INJECTION, EMULSION INTRAVENOUS at 09:06

## 2024-04-30 RX ADMIN — LIDOCAINE HYDROCHLORIDE 50 MG: 20 INJECTION, SOLUTION EPIDURAL; INFILTRATION; INTRACAUDAL; PERINEURAL at 08:44

## 2024-04-30 RX ADMIN — PROPOFOL 40 MG: 10 INJECTION, EMULSION INTRAVENOUS at 09:03

## 2024-04-30 ASSESSMENT — PAIN - FUNCTIONAL ASSESSMENT: PAIN_FUNCTIONAL_ASSESSMENT: NONE - DENIES PAIN

## 2024-04-30 NOTE — ANESTHESIA POSTPROCEDURE EVALUATION
Post-Anesthesia Evaluation and Assessment    Patient: Honey Patrick MRN: [id number]  SSN: [id number]    YOB: 1955  Age: 68 y.o.  Sex: female      I have evaluated the patient and they are stable and ready for discharge from the PACU.     Cardiovascular Function/Vital Signs  Visit Vitals  BP 96/71   Pulse 69   Temp 97.2 °F (36.2 °C) (Temporal)   Resp 18   Ht 1.651 m (5' 5\")   Wt 73.5 kg (162 lb)   SpO2 98%   BMI 26.96 kg/m²       Patient is status post Monitor Anesthesia Care anesthesia for Procedure(s):  COLONOSCOPY.    Nausea/Vomiting: None    Postoperative hydration reviewed and adequate.    Pain:      Managed    Neurological Status:       At baseline    Mental Status, Level of Consciousness: Alert and  oriented to person, place, and time    Pulmonary Status:       Adequate oxygenation and airway patent    Complications related to anesthesia: None    Post-anesthesia assessment completed. No concerns    Signed By: Evan Burgos MD     April 30, 2024            Department of Anesthesiology  Postprocedure Note    Patient: Honey Patrick  MRN: [id number]  YOB: 1955  Date of evaluation: 4/30/2024    Procedure Summary       Date: 04/30/24 Room / Location: [facility] ENDO 06 / [facility] ENDOSCOPY    Anesthesia Start: 0836 Anesthesia Stop: 0913    Procedure: COLONOSCOPY Diagnosis:       Special screening for malignant neoplasms, colon      (Special screening for malignant neoplasms, colon [Z12.11])    Surgeons: Ella Barber MD Responsible Provider: Evan Burgos MD    Anesthesia Type: MAC ASA Status: 2            Anesthesia Type: MAC    Brett Phase I: Brett Score: 10    Brett Phase II: Brett Score: 10    Anesthesia Post Evaluation    No notable events documented.

## 2024-04-30 NOTE — OP NOTE
[facility] [address] [address] [phone]               Colonoscopy Operative Report      Indications: Average risk screening, negative colonoscopy 10 yrs back    :  Ella Barber MD    Staff: Circulator: Librado Nesbitt, RN; Mireille Jain RN     Referring Provider: Dashawn Gonzalez MD    Sedation:  MAC anesthesia    Procedure Details:  After informed consent was obtained with all risks and benefits of procedure explained and preoperative exam completed, the patient was taken to the endoscopy suite and placed in the left lateral decubitus position.  Upon sequential sedation as per above, a digital rectal exam was performed  And was normal.  The Olympus videocolonoscope  was inserted in the rectum and carefully advanced to the cecum, which was identified by the ileocecal valve and appendiceal orifice.  The quality of preparation was good.  The colonoscope was slowly withdrawn with careful evaluation between folds. Retroflexion in the rectum was performed and was normal..     Findings:   Rectum: normal  Sigmoid: 1  Sessile polyp(s), the largest 3 mm in size;  Descending Colon: normal  Transverse Colon: 1  Sessile polyp(s), the largest 5 mm in size;  Ascending Colon: normal  Cecum: normal  Terminal Ileum: not intubated    Interventions:  2 complete polypectomy were performed using cold snare  and the polyps were  retrieved    Specimen Removed:   ID Type Source Tests Collected by Time Destination   1 : Sigmoid Colon Polyp Tissue Sigmoid Colon SURGICAL PATHOLOGY Ella Barber MD 4/30/2024 0853    2 : Transverse Colon Polyp Tissue Colon-Transverse SURGICAL PATHOLOGY Ella Barber MD 4/30/2024 0902        EBL:  Minimal    Complications:  None; patient tolerated the procedure well.    Impression:  -Two small (3-5 mm) polyps found in the sigmoid and transverse colon -removed and sent for pathology  -Otherwise normal exam.    Recommendations:   -Resume normal

## 2024-04-30 NOTE — H&P
chloride infusion  25 mL IntraVENous PRN       PHYSICAL EXAM:    General: Alert, cooperative, no acute distress    HEENT: Atraumatic.  PERRLA, EOMI. Anicteric sclerae.  Lungs:  Comfortable breathing. No obvious use of accessory muscles. Not on oxygen  Abdomen: Soft, Non distended, Non tender. No hepatosplenomegaly  Extremities: No cyanosis or edema  Neurologic:  CN 2-12 grossly intact, Alert and oriented X 3.  No acute neurological distress   Psych:   Good insight. Not anxious nor agitated.     Assessment:   Average risk screening, negative colonoscopy 10 yrs back    Plan:   Endoscopic procedure: Colonoscopy  Anesthesia plan: Monitored Anesthesia Care    Ella Barber MD  4/30/20248:35 AM

## 2024-04-30 NOTE — ANESTHESIA PRE PROCEDURE
breast reconstruction Z98.890    Mild depression F32.A    Advance directive discussed with patient Z71.89    History of mastectomy Z90.10    HTN (hypertension) I10    Major depressive disorder, single episode, mild (HCC) F32.0       Past Medical History:        Diagnosis Date    Cancer (HCC)     Depression 4/1/2009    History of mastectomy 4/1/2009    History of tonsillectomy 4/1/2009    Hx of breast reconstruction 4/1/2009    S/P LASIK surgery 4/1/2009       Past Surgical History:        Procedure Laterality Date    BREAST RECONSTRUCTION      BREAST RECONSTRUCTION Bilateral 11/2022    BREAST SURGERY      MASTECTOMY Left     REFRACTIVE SURGERY      TONSILLECTOMY         Social History:    Social History     Tobacco Use    Smoking status: Never    Smokeless tobacco: Never   Substance Use Topics    Alcohol use: Not Currently     Comment: rarely                                Counseling given: Not Answered      Vital Signs (Current):   Vitals:    04/30/24 0834 04/30/24 0839   BP:  134/86   Pulse:  68   Resp:  16   Temp: 97.8 °F (36.6 °C)    TempSrc: Temporal    SpO2:  94%   Weight: 73.5 kg (162 lb)    Height: 1.651 m (5' 5\")                                               BP Readings from Last 3 Encounters:   04/30/24 134/86   01/18/24 111/81   11/15/23 97/67       NPO Status: Time of last liquid consumption: 0000                        Time of last solid consumption: 0000                        Date of last liquid consumption: 04/30/24                        Date of last solid food consumption: 04/29/24    BMI:   Wt Readings from Last 3 Encounters:   04/30/24 73.5 kg (162 lb)   01/18/24 72.7 kg (160 lb 3.2 oz)   11/15/23 72.6 kg (160 lb)     Body mass index is 26.96 kg/m².    CBC:   Lab Results   Component Value Date/Time    WBC 6.9 01/25/2024 10:43 AM    RBC 4.51 01/25/2024 10:43 AM    HGB 13.6 01/25/2024 10:43 AM    HCT 39.5 01/25/2024 10:43 AM    MCV 87.6 01/25/2024 10:43 AM    RDW 12.2 01/25/2024 10:43 AM

## 2024-04-30 NOTE — DISCHARGE INSTRUCTIONS
[facility] [address] [address]          Honey Patrick  [id number]  1955    COLON DISCHARGE INSTRUCTIONS    DISCOMFORT:  Redness at IV site- apply warm compress to area; if redness or soreness persist- contact your physician  There may be a slight amount of blood passed from the rectum  Gaseous discomfort- walking, belching will help relieve any discomfort    DIET:   Regular diet.     ACTIVITY:  You may resume your normal daily activities it is recommended that you spend the remainder of the day resting -  avoid any strenuous activity.  You may not operate a vehicle for 12 hours  You may not engage in an occupation involving machinery or appliances for rest of today  You may not drink alcoholic beverages for at least 12 hours  Avoid making any critical decisions for at least 24 hour    CALL M.D.  ANY SIGN OF:   Increasing pain, nausea, vomiting  Abdominal distension (swelling)  New increased bleeding (oral or rectal)  Fever (chills)  Pain in chest area  Bloody discharge from nose or mouth  Shortness of breath     Follow-up Instructions:   Call Dr. Ella aBrber for any questions or problems.   Telephone # [phone]  Biopsy results will be available in  5 to 7 days    Impression:  -Two small (3-5 mm) polyps found in the sigmoid and transverse colon -removed and sent for pathology  -Otherwise normal exam.    Recommendations:   -Resume normal medication(s).  -Resume regular diet   -Await pathology.  -If adenoma is present, repeat colonoscopy in 5 years.      Ella Barber MD         Colon Polyps: Care Instructions  Your Care Instructions     Colon polyps are growths in the colon or the rectum. The cause of most colon polyps is not known, and most people who get them do not have any problems. But a certain kind can turn into cancer. For this reason, regular testing for colon polyps is important for people as they get older. It is also important for anyone who has an

## 2024-05-23 RX ORDER — DESVENLAFAXINE 100 MG/1
100 TABLET, EXTENDED RELEASE ORAL DAILY
Qty: 30 TABLET | Refills: 0 | Status: SHIPPED | OUTPATIENT
Start: 2024-05-23

## 2024-08-19 RX ORDER — DESVENLAFAXINE 100 MG/1
100 TABLET, EXTENDED RELEASE ORAL DAILY
Qty: 30 TABLET | Refills: 0 | Status: SHIPPED | OUTPATIENT
Start: 2024-08-19

## 2024-09-01 RX ORDER — DESVENLAFAXINE 100 MG/1
100 TABLET, EXTENDED RELEASE ORAL DAILY
Qty: 90 TABLET | OUTPATIENT
Start: 2024-09-01

## 2024-09-04 ENCOUNTER — E-VISIT (OUTPATIENT)
Age: 69
End: 2024-09-04
Payer: MEDICARE

## 2024-09-04 DIAGNOSIS — L23.7 POISON IVY: Primary | ICD-10-CM

## 2024-09-04 PROCEDURE — 99421 OL DIG E/M SVC 5-10 MIN: CPT | Performed by: INTERNAL MEDICINE

## 2024-09-04 RX ORDER — PREDNISONE 10 MG/1
TABLET ORAL
Qty: 30 TABLET | Refills: 0 | Status: SHIPPED | OUTPATIENT
Start: 2024-09-04

## 2024-09-04 NOTE — PROGRESS NOTES
E-Visit Note:    HPI: per patient questionnaire, this has been reviewed  EXAM: n/a    ----------------------------------  1. Poison ivy     Prednisone taper  ----------------------------------  The patient was advised to call if these symptoms worsen or fail to improve as anticipated.    5-10 minutes were spent on the digital evaluation and management of this patient.     Dashawn Gonzalez MD

## 2024-09-20 LAB — MAMMOGRAPHY, EXTERNAL: NORMAL

## 2024-10-02 RX ORDER — DESVENLAFAXINE 100 MG/1
100 TABLET, EXTENDED RELEASE ORAL DAILY
Qty: 90 TABLET | Refills: 0 | Status: SHIPPED | OUTPATIENT
Start: 2024-10-02

## 2024-10-02 NOTE — TELEPHONE ENCOUNTER
Chief Complaint   Patient presents with    Medication Refill     Last Appointment with Dr. Dashawn Gonzalez:  1/18/24  No future appointments.

## 2024-11-25 ENCOUNTER — TELEPHONE (OUTPATIENT)
Age: 69
End: 2024-11-25

## 2024-11-25 NOTE — TELEPHONE ENCOUNTER
----- Message from Frederick LEO sent at 11/25/2024  9:10 AM EST -----  Regarding: [facility] Appointment Request  [facility] Appointment Request    Patient needs appointment for [facility] Appointment Type: Annual Visit.    Patient Requested Dates(s): December 30,2024  Patient Requested Time: 1:30  Provider Name: Carlos Mina    Reason for Appointment Request: Established Patient - Available appointments did not meet patient need, Patient request to have an appointment followed by her  Leander Baig on December 30, 2024  --------------------------------------------------------------------------------------------------------------------------    Relationship to Patient: Self     Call Back Information: OK to leave message on voicemail  Preferred Call Back Number: Phone [phone] (home)

## 2024-11-25 NOTE — TELEPHONE ENCOUNTER
Medication Refill Request    Honey Patrick is requesting a refill of the following medication(s):   valACYclovir (VALTREX) 1 gram tablet             Please send refill to:     [facility] [id number] - [place], [place] - [address] [phone] - F [phone] [address] [address]  Phone: [phone] Fax: [phone]

## 2024-11-26 RX ORDER — VALACYCLOVIR HYDROCHLORIDE 1 G/1
2000 TABLET, FILM COATED ORAL 2 TIMES DAILY
Qty: 12 TABLET | Refills: 3 | Status: SHIPPED | OUTPATIENT
Start: 2024-11-26 | End: 2024-11-27

## 2024-11-26 NOTE — TELEPHONE ENCOUNTER
Pt last seen on 1/18/24. No follow-ups on file.     Has appt on 3/6/25.    Do not see on med list.    Will forward to MD for refill.

## 2024-12-18 ENCOUNTER — TELEPHONE (OUTPATIENT)
Age: 69
End: 2024-12-18

## 2024-12-18 NOTE — TELEPHONE ENCOUNTER
Patient's  states his wife has an MRI early tomorrow morning and needs to take her anti-nausea med before the procedure.  The Andansetron ODT 8mg Dr. Gonzalez prescribed --  11/15/24 -- do you think that is still good?  If not, she will need a new prescription sent to [facility] today.  Please let  know.      [facility] on On license of [facility]

## 2024-12-18 NOTE — TELEPHONE ENCOUNTER
Spoke w/ pt and notified per Dashawn Gonzalez MD should be fine to take the  rx for her zofran.  Pt voiced understanding.

## 2024-12-30 RX ORDER — VALACYCLOVIR HYDROCHLORIDE 1 G/1
2000 TABLET, FILM COATED ORAL 2 TIMES DAILY
Qty: 12 TABLET | Refills: 3 | Status: SHIPPED | OUTPATIENT
Start: 2024-12-30 | End: 2024-12-31

## 2024-12-31 RX ORDER — DESVENLAFAXINE 100 MG/1
100 TABLET, EXTENDED RELEASE ORAL DAILY
Qty: 90 TABLET | Refills: 0 | Status: SHIPPED | OUTPATIENT
Start: 2024-12-31

## 2024-12-31 NOTE — TELEPHONE ENCOUNTER
Chief Complaint   Patient presents with    Medication Refill     Last Appointment with Dr. Dashawn Gonzalez:  9/4/2024   Future Appointments   Date Time Provider Department Center   3/6/2025 10:30 AM Dashawn Gonzalez MD [facility] DEP

## 2025-03-06 ENCOUNTER — OFFICE VISIT (OUTPATIENT)
Age: 70
End: 2025-03-06
Payer: MEDICARE

## 2025-03-06 VITALS
RESPIRATION RATE: 15 BRPM | DIASTOLIC BLOOD PRESSURE: 80 MMHG | HEIGHT: 65 IN | WEIGHT: 156.6 LBS | TEMPERATURE: 97.9 F | HEART RATE: 70 BPM | SYSTOLIC BLOOD PRESSURE: 109 MMHG | OXYGEN SATURATION: 96 % | BODY MASS INDEX: 26.09 KG/M2

## 2025-03-06 DIAGNOSIS — Z85.3 HISTORY OF BREAST CANCER: ICD-10-CM

## 2025-03-06 DIAGNOSIS — F32.A MILD DEPRESSION: ICD-10-CM

## 2025-03-06 DIAGNOSIS — I10 PRIMARY HYPERTENSION: ICD-10-CM

## 2025-03-06 DIAGNOSIS — Z00.00 MEDICARE ANNUAL WELLNESS VISIT, SUBSEQUENT: Primary | ICD-10-CM

## 2025-03-06 DIAGNOSIS — E78.2 MIXED HYPERLIPIDEMIA: ICD-10-CM

## 2025-03-06 DIAGNOSIS — E55.9 VITAMIN D DEFICIENCY, UNSPECIFIED: ICD-10-CM

## 2025-03-06 DIAGNOSIS — H25.9 AGE-RELATED CATARACT OF BOTH EYES, UNSPECIFIED AGE-RELATED CATARACT TYPE: ICD-10-CM

## 2025-03-06 PROCEDURE — 99214 OFFICE O/P EST MOD 30 MIN: CPT | Performed by: INTERNAL MEDICINE

## 2025-03-06 RX ORDER — LOSARTAN POTASSIUM AND HYDROCHLOROTHIAZIDE 12.5; 5 MG/1; MG/1
1 TABLET ORAL DAILY
Qty: 90 TABLET | Refills: 3 | Status: SHIPPED | OUTPATIENT
Start: 2025-03-06

## 2025-03-06 RX ORDER — VALACYCLOVIR HYDROCHLORIDE 500 MG/1
TABLET, FILM COATED ORAL
COMMUNITY

## 2025-03-06 SDOH — ECONOMIC STABILITY: FOOD INSECURITY: WITHIN THE PAST 12 MONTHS, THE FOOD YOU BOUGHT JUST DIDN'T LAST AND YOU DIDN'T HAVE MONEY TO GET MORE.: NEVER TRUE

## 2025-03-06 SDOH — ECONOMIC STABILITY: FOOD INSECURITY: WITHIN THE PAST 12 MONTHS, YOU WORRIED THAT YOUR FOOD WOULD RUN OUT BEFORE YOU GOT MONEY TO BUY MORE.: NEVER TRUE

## 2025-03-06 ASSESSMENT — PATIENT HEALTH QUESTIONNAIRE - PHQ9
10. IF YOU CHECKED OFF ANY PROBLEMS, HOW DIFFICULT HAVE THESE PROBLEMS MADE IT FOR YOU TO DO YOUR WORK, TAKE CARE OF THINGS AT HOME, OR GET ALONG WITH OTHER PEOPLE: NOT DIFFICULT AT ALL
1. LITTLE INTEREST OR PLEASURE IN DOING THINGS: NOT AT ALL
7. TROUBLE CONCENTRATING ON THINGS, SUCH AS READING THE NEWSPAPER OR WATCHING TELEVISION: SEVERAL DAYS
SUM OF ALL RESPONSES TO PHQ QUESTIONS 1-9: 3
9. THOUGHTS THAT YOU WOULD BE BETTER OFF DEAD, OR OF HURTING YOURSELF: NOT AT ALL
5. POOR APPETITE OR OVEREATING: SEVERAL DAYS
3. TROUBLE FALLING OR STAYING ASLEEP: NOT AT ALL
SUM OF ALL RESPONSES TO PHQ QUESTIONS 1-9: 3
SUM OF ALL RESPONSES TO PHQ QUESTIONS 1-9: 3
2. FEELING DOWN, DEPRESSED OR HOPELESS: NOT AT ALL
6. FEELING BAD ABOUT YOURSELF - OR THAT YOU ARE A FAILURE OR HAVE LET YOURSELF OR YOUR FAMILY DOWN: NOT AT ALL
4. FEELING TIRED OR HAVING LITTLE ENERGY: SEVERAL DAYS
8. MOVING OR SPEAKING SO SLOWLY THAT OTHER PEOPLE COULD HAVE NOTICED. OR THE OPPOSITE, BEING SO FIGETY OR RESTLESS THAT YOU HAVE BEEN MOVING AROUND A LOT MORE THAN USUAL: NOT AT ALL
SUM OF ALL RESPONSES TO PHQ QUESTIONS 1-9: 3

## 2025-03-06 ASSESSMENT — LIFESTYLE VARIABLES
HOW OFTEN DO YOU HAVE A DRINK CONTAINING ALCOHOL: MONTHLY OR LESS
HOW MANY STANDARD DRINKS CONTAINING ALCOHOL DO YOU HAVE ON A TYPICAL DAY: 1 OR 2

## 2025-03-06 NOTE — PROGRESS NOTES
500 MG As directed BID, 1 tablet Yes ProviderPalomo MD   losartan-hydroCHLOROthiazide (HYZAAR) 50-12.5 MG per tablet Take 1 tablet by mouth daily Yes Dashawn Gonzalez MD   desvenlafaxine succinate (PRISTIQ) 100 MG TB24 extended release tablet TAKE 1 TABLET BY MOUTH DAILY Yes Dashawn Gonzalez MD   amLODIPine (NORVASC) 5 MG tablet Take by mouth daily Yes Automatic Reconciliation, Ar   NONFORMULARY Over the counter pill for eye dryness  Patient not taking: Reported on 3/6/2025  Provider, Historical, MD       CareTeam (Including outside providers/suppliers regularly involved in providing care):   Patient Care Team:  Dashawn Gonzalez MD as PCP - General  Dashawn Gonzalez MD as PCP - Empaneled Provider     Recommendations for Preventive Services Due: see orders and patient instructions/AVS.  Recommended screening schedule for the next 5-10 years is provided to the patient in written form: see Patient Instructions/AVS.     Reviewed and updated this visit:  Tobacco  Allergies  Meds  Med Hx  Sexual Hx

## 2025-03-10 DIAGNOSIS — E55.9 VITAMIN D DEFICIENCY, UNSPECIFIED: ICD-10-CM

## 2025-03-10 DIAGNOSIS — I10 PRIMARY HYPERTENSION: ICD-10-CM

## 2025-03-10 DIAGNOSIS — E78.2 MIXED HYPERLIPIDEMIA: ICD-10-CM

## 2025-03-10 LAB
25(OH)D3 SERPL-MCNC: 33.5 NG/ML (ref 30–100)
ALBUMIN SERPL-MCNC: 3.8 G/DL (ref 3.5–5)
ALBUMIN/GLOB SERPL: 1.4 (ref 1.1–2.2)
ALP SERPL-CCNC: 101 U/L (ref 45–117)
ALT SERPL-CCNC: 23 U/L (ref 12–78)
ANION GAP SERPL CALC-SCNC: 5 MMOL/L (ref 2–12)
AST SERPL-CCNC: 14 U/L (ref 15–37)
BASOPHILS # BLD: 0.03 K/UL (ref 0–0.1)
BASOPHILS NFR BLD: 0.5 % (ref 0–1)
BILIRUB SERPL-MCNC: 0.3 MG/DL (ref 0.2–1)
BUN SERPL-MCNC: 21 MG/DL (ref 6–20)
BUN/CREAT SERPL: 36 (ref 12–20)
CALCIUM SERPL-MCNC: 9.3 MG/DL (ref 8.5–10.1)
CHLORIDE SERPL-SCNC: 104 MMOL/L (ref 97–108)
CHOLEST SERPL-MCNC: 267 MG/DL
CO2 SERPL-SCNC: 28 MMOL/L (ref 21–32)
CREAT SERPL-MCNC: 0.59 MG/DL (ref 0.55–1.02)
DIFFERENTIAL METHOD BLD: ABNORMAL
EOSINOPHIL # BLD: 0.08 K/UL (ref 0–0.4)
EOSINOPHIL NFR BLD: 1.4 % (ref 0–7)
ERYTHROCYTE [DISTWIDTH] IN BLOOD BY AUTOMATED COUNT: 12.1 % (ref 11.5–14.5)
GLOBULIN SER CALC-MCNC: 2.8 G/DL (ref 2–4)
GLUCOSE SERPL-MCNC: 92 MG/DL (ref 65–100)
HCT VFR BLD AUTO: 39.3 % (ref 35–47)
HDLC SERPL-MCNC: 77 MG/DL
HDLC SERPL: 3.5 (ref 0–5)
HGB BLD-MCNC: 13.4 G/DL (ref 11.5–16)
IMM GRANULOCYTES # BLD AUTO: 0.04 K/UL (ref 0–0.04)
IMM GRANULOCYTES NFR BLD AUTO: 0.7 % (ref 0–0.5)
LDLC SERPL CALC-MCNC: 181.8 MG/DL (ref 0–100)
LYMPHOCYTES # BLD: 2.24 K/UL (ref 0.8–3.5)
LYMPHOCYTES NFR BLD: 38.7 % (ref 12–49)
MCH RBC QN AUTO: 30 PG (ref 26–34)
MCHC RBC AUTO-ENTMCNC: 34.1 G/DL (ref 30–36.5)
MCV RBC AUTO: 88.1 FL (ref 80–99)
MONOCYTES # BLD: 0.43 K/UL (ref 0–1)
MONOCYTES NFR BLD: 7.4 % (ref 5–13)
NEUTS SEG # BLD: 2.97 K/UL (ref 1.8–8)
NEUTS SEG NFR BLD: 51.3 % (ref 32–75)
NRBC # BLD: 0 K/UL (ref 0–0.01)
NRBC BLD-RTO: 0 PER 100 WBC
PLATELET # BLD AUTO: 292 K/UL (ref 150–400)
PMV BLD AUTO: 10 FL (ref 8.9–12.9)
POTASSIUM SERPL-SCNC: 3.6 MMOL/L (ref 3.5–5.1)
PROT SERPL-MCNC: 6.6 G/DL (ref 6.4–8.2)
RBC # BLD AUTO: 4.46 M/UL (ref 3.8–5.2)
SODIUM SERPL-SCNC: 137 MMOL/L (ref 136–145)
TRIGL SERPL-MCNC: 41 MG/DL
TSH SERPL DL<=0.05 MIU/L-ACNC: 1.35 UIU/ML (ref 0.36–3.74)
VLDLC SERPL CALC-MCNC: 8.2 MG/DL
WBC # BLD AUTO: 5.8 K/UL (ref 3.6–11)

## 2025-03-11 ENCOUNTER — RESULTS FOLLOW-UP (OUTPATIENT)
Age: 70
End: 2025-03-11

## 2025-03-26 ENCOUNTER — TELEMEDICINE (OUTPATIENT)
Age: 70
End: 2025-03-26
Payer: MEDICARE

## 2025-03-26 DIAGNOSIS — R39.9 UTI SYMPTOMS: ICD-10-CM

## 2025-03-26 DIAGNOSIS — R39.9 UTI SYMPTOMS: Primary | ICD-10-CM

## 2025-03-26 LAB
APPEARANCE UR: ABNORMAL
BACTERIA URNS QL MICRO: ABNORMAL /HPF
BILIRUB UR QL: NEGATIVE
COLOR UR: ABNORMAL
EPITH CASTS URNS QL MICRO: ABNORMAL /LPF
GLUCOSE UR STRIP.AUTO-MCNC: NEGATIVE MG/DL
HGB UR QL STRIP: ABNORMAL
KETONES UR QL STRIP.AUTO: NEGATIVE MG/DL
LEUKOCYTE ESTERASE UR QL STRIP.AUTO: ABNORMAL
NITRITE UR QL STRIP.AUTO: NEGATIVE
PH UR STRIP: 6.5 (ref 5–8)
PROT UR STRIP-MCNC: 30 MG/DL
RBC #/AREA URNS HPF: ABNORMAL /HPF (ref 0–5)
SP GR UR REFRACTOMETRY: 1.01 (ref 1–1.03)
UROBILINOGEN UR QL STRIP.AUTO: 0.2 EU/DL (ref 0.2–1)
WBC URNS QL MICRO: ABNORMAL /HPF (ref 0–4)

## 2025-03-26 PROCEDURE — G8427 DOCREV CUR MEDS BY ELIG CLIN: HCPCS | Performed by: INTERNAL MEDICINE

## 2025-03-26 PROCEDURE — 1123F ACP DISCUSS/DSCN MKR DOCD: CPT | Performed by: INTERNAL MEDICINE

## 2025-03-26 PROCEDURE — 1090F PRES/ABSN URINE INCON ASSESS: CPT | Performed by: INTERNAL MEDICINE

## 2025-03-26 PROCEDURE — 3017F COLORECTAL CA SCREEN DOC REV: CPT | Performed by: INTERNAL MEDICINE

## 2025-03-26 PROCEDURE — 1159F MED LIST DOCD IN RCRD: CPT | Performed by: INTERNAL MEDICINE

## 2025-03-26 PROCEDURE — 99213 OFFICE O/P EST LOW 20 MIN: CPT | Performed by: INTERNAL MEDICINE

## 2025-03-26 PROCEDURE — G8399 PT W/DXA RESULTS DOCUMENT: HCPCS | Performed by: INTERNAL MEDICINE

## 2025-03-26 PROCEDURE — 1036F TOBACCO NON-USER: CPT | Performed by: INTERNAL MEDICINE

## 2025-03-26 PROCEDURE — G8419 CALC BMI OUT NRM PARAM NOF/U: HCPCS | Performed by: INTERNAL MEDICINE

## 2025-03-26 RX ORDER — DESVENLAFAXINE 100 MG/1
100 TABLET, EXTENDED RELEASE ORAL DAILY
Qty: 90 TABLET | Refills: 3 | Status: SHIPPED | OUTPATIENT
Start: 2025-03-26

## 2025-03-26 RX ORDER — NITROFURANTOIN 25; 75 MG/1; MG/1
100 CAPSULE ORAL 2 TIMES DAILY
Qty: 12 CAPSULE | Refills: 1 | Status: SHIPPED | OUTPATIENT
Start: 2025-03-26 | End: 2025-03-26

## 2025-03-26 RX ORDER — NITROFURANTOIN 25; 75 MG/1; MG/1
100 CAPSULE ORAL 2 TIMES DAILY
Qty: 12 CAPSULE | Refills: 1 | Status: SHIPPED | OUTPATIENT
Start: 2025-03-26 | End: 2025-04-01

## 2025-03-26 NOTE — PROGRESS NOTES
Honey Patrick is a 69 y.o. female who was seen by synchronous (real-time) audio-video technology on 3/26/2025.      Assessment & Plan:   Below is the assessment and plan developed based on review of pertinent history, physical exam (if applicable), labs, studies, and medications.    Assessment & Plan  UTI symptoms   Acute condition, new, Obtain labs per orders.  Antibiotics presumptively.  Since she is going to [place] next week we will send her with an extra supply.    Orders:    Urinalysis with Microscopic; Future    Culture, Urine; Future          Subjective:   Honey Patrick was seen for Urinary Tract Infection    Since last visit: no changes    24-hour history of increased pelvic pressure and dysuria.  She just had a viral illness with diarrhea.  She also had a long car trip and did not urinate for 8 hours.  Some chills.  No documented fever.  No nausea or vomiting.  No melena or hematochezia.    Prior to Admission medications    Medication Sig Start Date End Date Taking? Authorizing Provider   desvenlafaxine succinate (PRISTIQ) 100 MG TB24 extended release tablet Take 1 tablet by mouth daily 3/26/25  Yes Dashawn Gonzalez MD   nitrofurantoin, macrocrystal-monohydrate, (MACROBID) 100 MG capsule Take 1 capsule by mouth 2 times daily for 14 days 3/26/25 4/9/25 Yes Dashawn Gonzalez MD   valACYclovir (VALTREX) 500 MG tablet 500 MG As directed BID, 1 tablet   Yes Provider, MD Palomo   losartan-hydroCHLOROthiazide (HYZAAR) 50-12.5 MG per tablet Take 1 tablet by mouth daily 3/6/25  Yes Dashawn Gonzalez MD   NONFORMULARY Over the counter pill for eye dryness   Yes Provider, MD Palomo   amLODIPine (NORVASC) 5 MG tablet Take by mouth daily 5/8/22  Yes Automatic Reconciliation, Ar       Patient Active Problem List    Diagnosis Date Noted    Major depressive disorder, single episode, mild 01/11/2023    HTN (hypertension) 05/08/2022    Mild depression 12/03/2018    History of breast cancer 11/07/2017    Advance

## 2025-03-27 LAB
BACTERIA SPEC CULT: ABNORMAL
CC UR VC: ABNORMAL
SERVICE CMNT-IMP: ABNORMAL

## 2025-06-26 ENCOUNTER — COMMUNITY OUTREACH (OUTPATIENT)
Age: 70
End: 2025-06-26

## 2025-08-28 DIAGNOSIS — F41.8 SITUATIONAL ANXIETY: ICD-10-CM

## 2025-08-28 RX ORDER — ALPRAZOLAM 0.5 MG
0.5 TABLET ORAL NIGHTLY PRN
Qty: 30 TABLET | Refills: 0 | Status: SHIPPED | OUTPATIENT
Start: 2025-08-28 | End: 2025-09-27

## (undated) DEVICE — TRAP SURG QUAD PARABOLA SLOT DSGN SFTY SCRN TRAPEASE

## (undated) DEVICE — SNARE ENDOSCP L240CM OD24MM LOOP W10MM RND INSUL STIFF BRAID